# Patient Record
Sex: MALE | Race: OTHER | HISPANIC OR LATINO | Employment: FULL TIME | URBAN - METROPOLITAN AREA
[De-identification: names, ages, dates, MRNs, and addresses within clinical notes are randomized per-mention and may not be internally consistent; named-entity substitution may affect disease eponyms.]

---

## 2018-04-01 ENCOUNTER — HOSPITAL ENCOUNTER (EMERGENCY)
Facility: HOSPITAL | Age: 33
Discharge: HOME | End: 2018-04-01
Attending: EMERGENCY MEDICINE | Admitting: EMERGENCY MEDICINE
Payer: MEDICAID

## 2018-04-01 VITALS
RESPIRATION RATE: 16 BRPM | OXYGEN SATURATION: 98 % | TEMPERATURE: 97.9 F | DIASTOLIC BLOOD PRESSURE: 70 MMHG | SYSTOLIC BLOOD PRESSURE: 108 MMHG | WEIGHT: 194.22 LBS | HEART RATE: 108 BPM

## 2018-04-01 DIAGNOSIS — J45.901 ASTHMA EXACERBATION: Primary | ICD-10-CM

## 2018-04-01 PROCEDURE — 99283 EMERGENCY DEPT VISIT LOW MDM: CPT

## 2018-04-01 PROCEDURE — 94640 AIRWAY INHALATION TREATMENT: CPT

## 2018-04-01 RX ORDER — SODIUM CHLORIDE FOR INHALATION 0.9 %
3 VIAL, NEBULIZER (ML) INHALATION ONCE
Status: COMPLETED | OUTPATIENT
Start: 2018-04-01 | End: 2018-04-01

## 2018-04-01 RX ORDER — ALBUTEROL SULFATE 90 UG/1
2 AEROSOL, METERED RESPIRATORY (INHALATION) ONCE
Status: COMPLETED | OUTPATIENT
Start: 2018-04-01 | End: 2018-04-01

## 2018-04-01 RX ORDER — ALBUTEROL SULFATE 90 UG/1
2 AEROSOL, METERED RESPIRATORY (INHALATION) EVERY 6 HOURS PRN
COMMUNITY
End: 2018-09-13

## 2018-04-01 RX ORDER — ALBUTEROL SULFATE 2.5 MG/3ML
10 SOLUTION RESPIRATORY (INHALATION) ONCE
Status: COMPLETED | OUTPATIENT
Start: 2018-04-01 | End: 2018-04-01

## 2018-04-01 RX ADMIN — ISODIUM CHLORIDE 3 ML: 0.03 SOLUTION RESPIRATORY (INHALATION) at 01:41

## 2018-04-01 RX ADMIN — IPRATROPIUM BROMIDE 1 MG: 0.5 SOLUTION RESPIRATORY (INHALATION) at 01:41

## 2018-04-01 RX ADMIN — ALBUTEROL SULFATE 2 PUFF: 90 AEROSOL, METERED RESPIRATORY (INHALATION) at 03:11

## 2018-04-01 RX ADMIN — ALBUTEROL SULFATE 10 MG: 2.5 SOLUTION RESPIRATORY (INHALATION) at 01:41

## 2018-04-01 RX ADMIN — DEXAMETHASONE SODIUM PHOSPHATE 10 MG: 10 INJECTION, SOLUTION INTRAMUSCULAR; INTRAVENOUS at 01:41

## 2018-04-01 NOTE — ED PROVIDER NOTES
History  Chief Complaint   Patient presents with    Asthma     Patient presents complaining of asthma flare up, started approximately 1 hr ago  Audible wheezes heard in route to ED room  Denies using any meds prior to arrival  Denies intubation/admission hx related to asthma  35 y/o male, hx of asthma, presents to the ED for wheeze and chest tightness x 1 hour  Patient states that he was at the movie theater and went outside to "get some fresh air " states that the cold air caused him to have chest tightness, SOB, and wheeze  States that symptoms feel exactly like prior asthma exacerbations  He reports that he ran out of his albuterol inhaler a few days ago and has not taken anything for symptoms  He denies any recent f/c, cough, congestion, abd pain, n/v/d, or urinary symptoms  States that he was last hospitalized for asthma years ago when he was a baby  Denies any prior intubations  Denies any recent steroid or antibiotic use  Patient admits to smoking  Denies alcohol or drug use  History provided by:  Patient  Asthma   Location:  Chest  Quality:  Tightness, sob, wheeze  Severity:  Moderate  Onset quality:  Sudden  Duration:  1 hour  Timing:  Constant  Progression:  Worsening  Chronicity:  New  Context:  Went outside from the movie theater   Relieved by:  Nothing  Worsened by:  Nothing  Ineffective treatments:  None tried   Associated symptoms: cough, shortness of breath and wheezing    Associated symptoms: no abdominal pain, no chest pain, no congestion, no diarrhea, no ear pain, no fever, no headaches, no nausea, no rash, no sore throat and no vomiting    Risk factors:  Hx of asthma       Prior to Admission Medications   Prescriptions Last Dose Informant Patient Reported? Taking?    albuterol (PROVENTIL HFA,VENTOLIN HFA) 90 mcg/act inhaler Unknown at Unknown time Self Yes No   Sig: Inhale 2 puffs every 6 (six) hours as needed for wheezing      Facility-Administered Medications: None       Past Medical History:   Diagnosis Date    Asthma        Past Surgical History:   Procedure Laterality Date    ABDOMINAL SURGERY         History reviewed  No pertinent family history  I have reviewed and agree with the history as documented  Social History   Substance Use Topics    Smoking status: Current Every Day Smoker     Packs/day: 0 25    Smokeless tobacco: Never Used    Alcohol use No        Review of Systems   Constitutional: Negative for chills and fever  HENT: Negative for congestion, ear pain and sore throat  Eyes: Negative for pain and visual disturbance  Respiratory: Positive for cough, shortness of breath and wheezing  Cardiovascular: Negative for chest pain and leg swelling  Gastrointestinal: Negative for abdominal pain, diarrhea, nausea and vomiting  Genitourinary: Negative for dysuria, frequency, hematuria and urgency  Musculoskeletal: Negative for neck pain and neck stiffness  Skin: Negative for rash and wound  Neurological: Negative for weakness, numbness and headaches  Psychiatric/Behavioral: Negative for agitation and confusion  All other systems reviewed and are negative  Physical Exam  ED Triage Vitals   Temperature Pulse Respirations Blood Pressure SpO2   04/01/18 0128 04/01/18 0128 04/01/18 0129 04/01/18 0128 04/01/18 0128   97 9 °F (36 6 °C) 90 22 105/76 98 %      Temp Source Heart Rate Source Patient Position - Orthostatic VS BP Location FiO2 (%)   04/01/18 0128 04/01/18 0128 04/01/18 0128 04/01/18 0128 --   Oral Monitor Sitting Left arm       Pain Score       04/01/18 0128       No Pain           Orthostatic Vital Signs  Vitals:    04/01/18 0128 04/01/18 0249   BP: 105/76 108/70   Pulse: 90 (!) 108   Patient Position - Orthostatic VS: Sitting Sitting       Physical Exam   Constitutional: He is oriented to person, place, and time  He appears well-developed and well-nourished  HENT:   Head: Normocephalic and atraumatic     Mouth/Throat: Oropharynx is clear and moist    Eyes: EOM are normal  Pupils are equal, round, and reactive to light  Neck: Normal range of motion  Neck supple  Cardiovascular: Normal rate and regular rhythm  Pulmonary/Chest: Effort normal  No respiratory distress  He has decreased breath sounds in the right upper field, the right lower field, the left upper field and the left lower field  He has wheezes  Decreased breath sounds throughout with end expiratory wheezing throughout as well  Abdominal: Soft  Bowel sounds are normal  He exhibits no distension  There is no tenderness  Musculoskeletal: Normal range of motion  Neurological: He is alert and oriented to person, place, and time  No focal deficits   Skin: Skin is warm and dry  Nursing note and vitals reviewed  ED Medications  Medications   albuterol inhalation solution 10 mg (10 mg Nebulization Given 4/1/18 0141)     And   ipratropium (ATROVENT) 0 02 % inhalation solution 1 mg (1 mg Nebulization Given 4/1/18 0141)     And   sodium chloride 0 9 % inhalation solution 3 mL (3 mL Nebulization Given 4/1/18 0141)   dexamethasone 10 mg/mL oral liquid 10 mg 1 mL (10 mg Oral Given 4/1/18 0141)   albuterol (PROVENTIL HFA,VENTOLIN HFA) inhaler 2 puff (2 puffs Inhalation Given 4/1/18 0311)       Diagnostic Studies  Results Reviewed     None                 No orders to display         Procedures  Procedures      Phone Consults  ED Phone Contact    ED Course  ED Course as of Apr 01 0348   Sun Apr 01, 2018   0314 Patient reevaluated and feels improved - will d/c home with albuterol inahler                                 MDM  Number of Diagnoses or Management Options  Asthma exacerbation: new and requires workup  Diagnosis management comments: Patient with cough, wheeze, chest tightness likely secondary to asthma exacerbation  Will give heart neb and steroids  Will also give an albuterol inhaler to go home with  Patient reevaluated and feels improved    Discharge instructions given including medications, follow-up, and return precautions  Patient demonstrates verbal understanding and agrees with plan  Amount and/or Complexity of Data Reviewed  Clinical lab tests: ordered and reviewed  Tests in the radiology section of CPT®: ordered and reviewed  Tests in the medicine section of CPT®: ordered and reviewed  Discussion of test results with the performing providers: yes  Decide to obtain previous medical records or to obtain history from someone other than the patient: yes  Obtain history from someone other than the patient: yes  Review and summarize past medical records: yes  Discuss the patient with other providers: yes  Independent visualization of images, tracings, or specimens: yes    Patient Progress  Patient progress: improved    CritCare Time    Disposition  Final diagnoses:   Asthma exacerbation     Time reflects when diagnosis was documented in both MDM as applicable and the Disposition within this note     Time User Action Codes Description Comment    4/1/2018  3:09 AM Shannon Shepherd Add [J45 [id number]] Asthma exacerbation       ED Disposition     ED Disposition Condition Comment    Discharge  Climmie League discharge to home/self care  Condition at discharge: Good        Follow-up Information     Follow up With Specialties Details Why Contact Info Additional Arcelia Christopher [facility] Call in 1 day for follow up within 2-3 days  [address] [id number] [id number] Emergency Department Emergency Medicine Go to immediately for any new or worsening symptoms    Qi Saels [address] [place] [facility], [address] , [place], [place], [address]        Discharge Medication List as of 4/1/2018  3:10 AM      CONTINUE these medications which have NOT CHANGED    Details   albuterol (PROVENTIL HFA,VENTOLIN HFA) 90 mcg/act inhaler Inhale 2 puffs every 6 (six) hours as needed for wheezing, Historical Med           No discharge procedures on file  ED Provider  Attending physically available and evaluated Georgiana Mcgill I managed the patient along with the ED Attending      Electronically Signed by         Chantelle Smith DO  04/01/18 0780

## 2018-04-01 NOTE — ED PROCEDURE NOTE
PROCEDURE  CriticalCare Time  Performed by: Dseiree Ash  Authorized by: Desiree Ash     Critical care provider statement:     Critical care time (minutes):  45    Critical care time was exclusive of:  Separately billable procedures and treating other patients and teaching time    Critical care was necessary to treat or prevent imminent or life-threatening deterioration of the following conditions:  Respiratory failure    Critical care was time spent personally by me on the following activities:  Blood draw for specimens, obtaining history from patient or surrogate, development of treatment plan with patient or surrogate, discussions with consultants, evaluation of patient's response to treatment, examination of patient, interpretation of cardiac output measurements, ordering and performing treatments and interventions, ordering and review of laboratory studies, ordering and review of radiographic studies, review of old charts and re-evaluation of patient's condition    I assumed direction of critical care for this patient from another provider in my specialty: jaci Santa , DO  04/01/18 4607

## 2018-04-01 NOTE — DISCHARGE INSTRUCTIONS
Asthma   WHAT YOU NEED TO KNOW:   Asthma is a lung disease that makes breathing difficult  Chronic inflammation and reactions to triggers narrow the airways in the lungs  Asthma can become life-threatening if it is not managed  DISCHARGE INSTRUCTIONS:   Return to the emergency department if:   · You have severe shortness of breath  · Your lips or nails turn blue or gray  · The skin around your neck and ribs pulls in with each breath  · You have shortness of breath, even after you take your short-term medicine as directed  · Your peak flow numbers are in the red zone of your AAP  Contact your healthcare provider if:   · You run out of medicine before your next refill is due  · Your symptoms get worse  · You need to take more medicine than usual to control your symptoms  · You have questions or concerns about your condition or care  Medicines:   · Medicines  decrease inflammation, open airways, and make it easier to breathe  Medicines may be inhaled, taken as a pill, or injected  Short-term medicines relieve your symptoms quickly  Long-term medicines are used to prevent future attacks  You may also need medicine to help control your allergies  Ask your healthcare provider for more information about the medicine you are given and how to take it safely  · Take your medicine as directed  Contact your healthcare provider if you think your medicine is not helping or if you have side effects  Tell him of her if you are allergic to any medicine  Keep a list of the medicines, vitamins, and herbs you take  Include the amounts, and when and why you take them  Bring the list or the pill bottles to follow-up visits  Carry your medicine list with you in case of an emergency  Follow up with your healthcare provider as directed: You will need to return to make sure your medicine is working and your symptoms are controlled   You may be referred to an asthma specialist  Lionel Ndiaye may be asked to keep a record of your peak flow values and bring it with you to your appointments  Write down your questions so you remember to ask them during your visits  Manage your symptoms and prevent future attacks:   · Follow your Asthma Action Plan (AAP)  This is a written plan that you and your healthcare provider create  It explains which medicine you need and when to change doses if necessary  It also explains how you can monitor symptoms and use a peak flow meter  The meter measures how well your lungs are working  · Manage other health conditions , such as allergies, acid reflux, and sleep apnea  · Identify and avoid triggers  These may include pets, dust mites, mold, and cockroaches  · Do not smoke or be around others who smoke  Nicotine and other chemicals in cigarettes and cigars can cause lung damage  Ask your healthcare provider for information if you currently smoke and need help to quit  E-cigarettes or smokeless tobacco still contain nicotine  Talk to your healthcare provider before you use these products  · Ask about the flu vaccine  The flu can make your asthma worse  You may need a yearly flu shot  © 2017 [facility] Information is for End User's use only and may not be sold, redistributed or otherwise used for commercial purposes  All illustrations and images included in CareNotes® are the copyrighted property of A D A M , Inc  or Curtis Joseph  The above information is an  only  It is not intended as medical advice for individual conditions or treatments  Talk to your doctor, nurse or pharmacist before following any medical regimen to see if it is safe and effective for you

## 2018-04-01 NOTE — ED ATTENDING ATTESTATION
Ivania Ballesteros DO, saw and evaluated the patient  I have discussed the patient with the resident/non-physician practitioner and agree with the resident's/non-physician practitioner's findings, Plan of Care, and MDM as documented in the resident's/non-physician practitioner's note, except where noted  All available labs and Radiology studies were reviewed  At this point I agree with the current assessment done in the Emergency Department  I have conducted an independent evaluation of this patient a history and physical is as follows:    62-year-old male presents for an asthma exacerbation tonight  He states that he was coming out of the movies tonight when he had an abrupt onset of coughing and wheezing  Patient recently ran out of his inhaler  He is not on any long-term maintenance  Patient has been otherwise well without any upper respiratory symptoms  Patient is in no acute distress on exam   Vital signs are noted and are normal  Lung sounds are decreased with wheezing  Heart sounds are normal without murmurs  Plan is to treat his exacerbation with a nebulizer and steroids and reassess  Patient much improved after his treatment  Will discharge home with an albuterol inhaler and outpatient follow-up    Critical Care Time  CritCare Time    Procedures

## 2018-07-24 ENCOUNTER — HOSPITAL ENCOUNTER (EMERGENCY)
Facility: HOSPITAL | Age: 33
Discharge: HOME | End: 2018-07-24
Attending: EMERGENCY MEDICINE | Admitting: EMERGENCY MEDICINE
Payer: MEDICAID

## 2018-07-24 VITALS
SYSTOLIC BLOOD PRESSURE: 116 MMHG | HEART RATE: 92 BPM | WEIGHT: 187.39 LBS | DIASTOLIC BLOOD PRESSURE: 77 MMHG | OXYGEN SATURATION: 98 % | TEMPERATURE: 98.4 F | RESPIRATION RATE: 15 BRPM

## 2018-07-24 DIAGNOSIS — H44.703: Primary | ICD-10-CM

## 2018-07-24 DIAGNOSIS — R06.2 WHEEZING: ICD-10-CM

## 2018-07-24 PROCEDURE — 99283 EMERGENCY DEPT VISIT LOW MDM: CPT

## 2018-07-24 RX ORDER — ALBUTEROL SULFATE 90 UG/1
1-2 AEROSOL, METERED RESPIRATORY (INHALATION) EVERY 6 HOURS PRN
Qty: 1 INHALER | Refills: 0 | Status: SHIPPED | OUTPATIENT
Start: 2018-07-24 | End: 2019-01-03

## 2018-07-24 RX ORDER — PROPARACAINE HYDROCHLORIDE 5 MG/ML
1 SOLUTION/ DROPS OPHTHALMIC ONCE
Status: COMPLETED | OUTPATIENT
Start: 2018-07-24 | End: 2018-07-24

## 2018-07-24 RX ADMIN — PROPARACAINE HYDROCHLORIDE 1 DROP: 5 SOLUTION/ DROPS OPHTHALMIC at 16:41

## 2018-07-24 RX ADMIN — FLUORESCEIN SODIUM AND PROPARACAINE HYDROCHLORIDE 1 DROP: 2.5; 5 SOLUTION/ DROPS OPHTHALMIC at 15:19

## 2018-07-24 NOTE — ED PROVIDER NOTES
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 and drain pipe burst   States got metal in his eyes  c/o burning pain in both  Did flush eyes     29-year-old male presents to the ER with bilateral eye pain and tearing that started last night at to a m  when patient was working on plumbing and states that a height purse and small amounts of metal flew up toward his eyes  Patient states that at that time he was wearing his regular glasses and denies any injury to the glasses but that the water went up and did hit his eyes  Patient states that he feels a foreign body sensation in both eyes and has had a large amount of tearing from both eyes and admits to mild photophobia  Patient states that he noticed foreign body in his right eye when he looked in the mirror  Patient states that he is feeling increased irritation since waking up this morning and came in to be examined due to concerns over possible foreign bodies in both eyes  Patient also states that he has a history of asthma and has run out of his inhaler  He does not currently have a family doctor and wanted to have a refill of his Ventolin inhaler  Prior to Admission Medications   Prescriptions Last Dose Informant Patient Reported? Taking? albuterol (PROVENTIL HFA,VENTOLIN HFA) 90 mcg/act inhaler  Self Yes No   Sig: Inhale 2 puffs every 6 (six) hours as needed for wheezing      Facility-Administered Medications: None       Past Medical History:   Diagnosis Date    Asthma        Past Surgical History:   Procedure Laterality Date    ABDOMINAL SURGERY         History reviewed  No pertinent family history  I have reviewed and agree with the history as documented      Social History   Substance Use Topics    Smoking status: Current Every Day Smoker     Packs/day: 0 25    Smokeless tobacco: Never Used    Alcohol use No        Review of Systems   Constitutional: Negative for chills and fever  HENT: Negative for congestion, ear pain, rhinorrhea, sinus pain, sinus pressure, trouble swallowing and voice change  Eyes: Positive for photophobia, pain, discharge ( clear tearing from eyes), redness and visual disturbance (Due to tearing, blurry vision)  Respiratory: Positive for wheezing  Negative for chest tightness and shortness of breath  Cardiovascular: Negative for chest pain and palpitations  Gastrointestinal: Negative for abdominal pain, nausea and vomiting  Musculoskeletal: Negative for arthralgias, myalgias, neck pain and neck stiffness  Skin: Negative for color change and rash  Neurological: Negative for dizziness, weakness, light-headedness, numbness and headaches  All other systems reviewed and are negative  Physical Exam  Physical Exam   Constitutional: He is oriented to person, place, and time  Vital signs are normal  He appears well-developed and well-nourished  HENT:   Head: Normocephalic and atraumatic  Eyes: Conjunctivae and EOM are normal  Pupils are equal, round, and reactive to light  Foreign body present in the right eye  Foreign body present in the left eye  Neck: Normal range of motion  Neck supple  Cardiovascular: Normal rate, regular rhythm and normal heart sounds  Pulmonary/Chest: Effort normal  He has wheezes (Mild expiratory wheezes noted throughout lung fields)  Abdominal: Soft  Bowel sounds are normal    Musculoskeletal: Normal range of motion  Neurological: He is alert and oriented to person, place, and time  Skin: Skin is warm and dry  Psychiatric: He has a normal mood and affect  His speech is normal and behavior is normal  Judgment and thought content normal  Cognition and memory are normal    Nursing note and vitals reviewed        Vital Signs  ED Triage Vitals [07/24/18 1433]   Temperature Pulse Respirations Blood Pressure SpO2   98 4 °F (36 9 °C) 92 15 116/77 98 %      Temp Source Heart Rate Source Patient Position - Orthostatic VS BP Location FiO2 (%)   Oral -- -- -- --      Pain Score       4           Vitals:    07/24/18 1433   BP: 116/77   Pulse: 92       Visual Acuity      ED Medications  Medications   Fluorescein-Proparacaine (FLUCAINE) 0 25-0 5 % ophthalmic solution 1 drop (1 drop Both Eyes Given 7/24/18 1519)   proparacaine (ALCAINE) 0 5 % ophthalmic solution 1 drop (1 drop Right Eye Given 7/24/18 1641)       Diagnostic Studies  Results Reviewed     None                 No orders to display              Procedures  Foreign Body - Ocular  Date/Time: 7/24/2018 3:30 PM  Performed by: Keiko Hutchison by: Cristhian Hutchison     Patient location:  ED  Other Assisting Provider: No    Consent:     Consent obtained:  Verbal    Consent given by:  Patient    Risks discussed:  Bleeding, globe perforation, pain, visual impairment, incomplete removal, corneal damage, damage to surrounding structures, infection and worsening of condition    Alternatives discussed:  No treatment  Universal protocol:     Procedure explained and questions answered to patient or proxy's satisfaction: yes      Patient identity confirmed:  Verbally with patient  Location:     Location: Left and right corneal foreign bodies  Depth:  Embedded  Pre-procedure details:     Imaging:  None    Correction: uncorrected      Fluorescein exam: yes      Fluorescein uptake: no    Anesthesia (see MAR for exact dosages):     Local anesthetic:  Proparacaine drops  Procedure details:     Localization method:  Direct visualization    Removal mechanism:  Moist cotton swab    Foreign bodies recovered:  None (Small black particles were removed from areas but full foreign bodies were not removed due to patient discomfort)    Intact foreign body removal: no      Residual rust ring observed: no    Post-procedure details:     Confirmation:  Residual foreign bodies remain    Patient tolerance of procedure:   Tolerated with difficulty  Comments:      Patient continued to have tearing and discomfort after proparacaine drops were placed procedure terminated due to patient discomfort  [address] [place] for sight in [address] and discussed patient  They suggested patient come right over to be seen before they closed today so he could have evaluation and attempt at removal of foreign bodies by eye doctor  Phone Contacts  ED Phone Contact    ED Course  ED Course as of Jul 27 0236 Tue Jul 24, 2018   [address] [place] for [facility] in [facility]                                MDM  Number of Diagnoses or Management Options  Retained foreign body in eye, bilateral: new and does not require workup  Wheezing: minor  Patient Progress  Patient progress: stable    CritCare Time    Disposition  Final diagnoses:   Retained foreign body in eye, bilateral   Wheezing     Time reflects when diagnosis was documented in both MDM as applicable and the Disposition within this note     Time User Action Codes Description Comment    7/24/2018  4:19 PM Aye Lyons [E33 [id number]] Retained foreign body in eye, bilateral     7/24/2018  4:33 PM Cierra Ling [R06 2] Wheezing       ED Disposition     ED Disposition Condition Comment    Discharge  Dominga Mention discharge to home/self care  Condition at discharge: Stable        Follow-up Information     Follow up With Specialties Details Why [facility]   Go for further evaluation of eyes [address]  [phone]          Discharge Medication List as of 7/24/2018  4:34 PM      START taking these medications    Details   !! albuterol (PROVENTIL HFA,VENTOLIN HFA) 90 mcg/act inhaler Inhale 1-2 puffs every 6 (six) hours as needed for wheezing, Starting Tue 7/24/2018, Print       !! - Potential duplicate medications found  Please discuss with provider        CONTINUE these medications which have NOT CHANGED    Details   !! albuterol (PROVENTIL HFA,VENTOLIN HFA) 90 mcg/act inhaler Inhale 2 puffs every 6 (six) hours as needed for wheezing, Historical Med       !! - Potential duplicate medications found  Please discuss with provider  No discharge procedures on file      ED Provider  Electronically Signed by           Marry Barton PA-C  07/27/18 0230

## 2018-07-24 NOTE — DISCHARGE INSTRUCTIONS
Wheezing   WHAT YOU NEED TO KNOW:   Wheezing happens when air flows through a narrowed airway  Wheezing can happen when you breathe in, breathe out, or both  Wheezes may sound like a whistle, squeal, groan, or creak  Wheezes may also sound musical or high-pitched  Wheezing cannot be stopped by coughing  Asthma, allergies, or infection are the most common causes of wheezing  A foreign body, asthma, extra mucus, or smoking can also cause wheezing  DISCHARGE INSTRUCTIONS:   Call 911 if:   · You have sudden trouble breathing  · Your throat feels like it is swelling or feels tight  · You are dizzy, lightheaded, confused, or feel faint  · You have chest pain or tightness  Return to the emergency department if:   · You have shortness of breath  · You are coughing up blood  · You have chest pain  Contact your healthcare provider if:   · You have a fever  · Your wheezing does not get better or it gets worse  · You have questions or concerns about your condition or care  Medicines:   · Medicines  decrease inflammation, open airways, and make it easier to breathe  · Take your medicine as directed  Contact your healthcare provider if you think your medicine is not helping or if you have side effects  Tell him of her if you are allergic to any medicine  Keep a list of the medicines, vitamins, and herbs you take  Include the amounts, and when and why you take them  Bring the list or the pill bottles to follow-up visits  Carry your medicine list with you in case of an emergency  Follow up with your healthcare provider as directed: You may be referred to a specialist  Write down your questions so you remember to ask them during your visits  Manage your symptoms:   · Avoid allergy triggers , such as animals, grass, pollen, or dust     · Return to your usual activity as directed  You may need to limit certain activities until you follow up with your healthcare provider or your symptoms improve  Your child may need to avoid sports until his symptoms improve  · Take deep breaths and cough several times a day  This will decrease your risk for a lung infection and help decrease wheezing  Take a deep breath and hold it for as long as you can  Let the air out and then cough strongly  Deep breaths help open your airway  You may be given an incentive spirometer to help you take deep breaths  Put the plastic piece in your mouth and take a slow, deep breath, then let the air out and cough  Repeat these steps 10 times every hour  · Drink liquids as directed  You may need to drink more liquids than usual to thin your mucus and prevent dehydration  Ask how much liquid to drink each day and which liquids are best for you  © 2017 [facility] [employer] Information is for End User's use only and may not be sold, redistributed or otherwise used for commercial purposes  All illustrations and images included in CareNotes® are the copyrighted property of A D A M [employer]  or Curtis Joseph  The above information is an  only  It is not intended as medical advice for individual conditions or treatments  Talk to your doctor, nurse or pharmacist before following any medical regimen to see if it is safe and effective for you

## 2018-09-13 ENCOUNTER — APPOINTMENT (EMERGENCY)
Dept: CT IMAGING | Facility: HOSPITAL | Age: 33
End: 2018-09-13
Payer: MEDICAID

## 2018-09-13 ENCOUNTER — HOSPITAL ENCOUNTER (EMERGENCY)
Facility: HOSPITAL | Age: 33
Discharge: HOME | End: 2018-09-13
Attending: EMERGENCY MEDICINE
Payer: MEDICAID

## 2018-09-13 VITALS
SYSTOLIC BLOOD PRESSURE: 108 MMHG | RESPIRATION RATE: 18 BRPM | DIASTOLIC BLOOD PRESSURE: 63 MMHG | OXYGEN SATURATION: 96 % | HEART RATE: 82 BPM | WEIGHT: 179.9 LBS | HEIGHT: 70 IN | TEMPERATURE: 98 F | BODY MASS INDEX: 25.75 KG/M2

## 2018-09-13 DIAGNOSIS — R11.2 NAUSEA & VOMITING: ICD-10-CM

## 2018-09-13 DIAGNOSIS — R51.9 HEADACHE: ICD-10-CM

## 2018-09-13 DIAGNOSIS — R10.9 ABDOMINAL PAIN: Primary | ICD-10-CM

## 2018-09-13 LAB
ALBUMIN SERPL BCP-MCNC: 4.8 G/DL (ref 3–5.2)
ALP SERPL-CCNC: 68 U/L (ref 43–122)
ALT SERPL W P-5'-P-CCNC: 40 U/L (ref 9–52)
ANION GAP SERPL CALCULATED.3IONS-SCNC: 9 MMOL/L (ref 5–14)
AST SERPL W P-5'-P-CCNC: 40 U/L (ref 17–59)
ATRIAL RATE: 83 BPM
BASOPHILS # BLD AUTO: 0 THOUSANDS/ΜL (ref 0–0.1)
BASOPHILS NFR BLD AUTO: 1 % (ref 0–1)
BILIRUB SERPL-MCNC: 1.1 MG/DL
BUN SERPL-MCNC: 13 MG/DL (ref 5–25)
CALCIUM SERPL-MCNC: 9.2 MG/DL (ref 8.4–10.2)
CHLORIDE SERPL-SCNC: 101 MMOL/L (ref 97–108)
CO2 SERPL-SCNC: 30 MMOL/L (ref 22–30)
CREAT SERPL-MCNC: 0.84 MG/DL (ref 0.7–1.5)
EOSINOPHIL # BLD AUTO: 0.2 THOUSAND/ΜL (ref 0–0.4)
EOSINOPHIL NFR BLD AUTO: 2 % (ref 0–6)
ERYTHROCYTE [DISTWIDTH] IN BLOOD BY AUTOMATED COUNT: 13.6 %
GFR SERPL CREATININE-BSD FRML MDRD: 114 ML/MIN/1.73SQ M
GLUCOSE SERPL-MCNC: 87 MG/DL (ref 70–99)
HCT VFR BLD AUTO: 46.1 % (ref 41–53)
HGB BLD-MCNC: 15.2 G/DL (ref 13.5–17.5)
LACTATE SERPL-SCNC: 1 MMOL/L (ref 0.7–2)
LIPASE SERPL-CCNC: 167 U/L (ref 23–300)
LYMPHOCYTES # BLD AUTO: 1.1 THOUSANDS/ΜL (ref 0.5–4)
LYMPHOCYTES NFR BLD AUTO: 13 % (ref 20–50)
MCH RBC QN AUTO: 27.5 PG (ref 26–34)
MCHC RBC AUTO-ENTMCNC: 33 G/DL (ref 31–36)
MCV RBC AUTO: 83 FL (ref 80–100)
MONOCYTES # BLD AUTO: 0.6 THOUSAND/ΜL (ref 0.2–0.9)
MONOCYTES NFR BLD AUTO: 7 % (ref 1–10)
NEUTROPHILS # BLD AUTO: 6.5 THOUSANDS/ΜL (ref 1.8–7.8)
NEUTS SEG NFR BLD AUTO: 78 % (ref 45–65)
PLATELET # BLD AUTO: 210 THOUSANDS/UL (ref 150–450)
PMV BLD AUTO: 9.7 FL (ref 8.9–12.7)
POTASSIUM SERPL-SCNC: 5.2 MMOL/L (ref 3.6–5)
PR INTERVAL: 160 MS
PROT SERPL-MCNC: 8.4 G/DL (ref 5.9–8.4)
QRS AXIS: 145 DEGREES
QRSD INTERVAL: 90 MS
QT INTERVAL: 358 MS
QTC INTERVAL: 420 MS
RBC # BLD AUTO: 5.54 MILLION/UL (ref 4.5–5.9)
SODIUM SERPL-SCNC: 140 MMOL/L (ref 137–147)
T WAVE AXIS: 145 DEGREES
VENTRICULAR RATE: 83 BPM
WBC # BLD AUTO: 8.4 THOUSAND/UL (ref 4.5–11)

## 2018-09-13 PROCEDURE — 83690 ASSAY OF LIPASE: CPT | Performed by: EMERGENCY MEDICINE

## 2018-09-13 PROCEDURE — 85025 COMPLETE CBC W/AUTO DIFF WBC: CPT | Performed by: EMERGENCY MEDICINE

## 2018-09-13 PROCEDURE — 36415 COLL VENOUS BLD VENIPUNCTURE: CPT | Performed by: EMERGENCY MEDICINE

## 2018-09-13 PROCEDURE — 80053 COMPREHEN METABOLIC PANEL: CPT | Performed by: EMERGENCY MEDICINE

## 2018-09-13 PROCEDURE — 93005 ELECTROCARDIOGRAM TRACING: CPT

## 2018-09-13 PROCEDURE — 96361 HYDRATE IV INFUSION ADD-ON: CPT

## 2018-09-13 PROCEDURE — 96374 THER/PROPH/DIAG INJ IV PUSH: CPT

## 2018-09-13 PROCEDURE — 93010 ELECTROCARDIOGRAM REPORT: CPT | Performed by: INTERNAL MEDICINE

## 2018-09-13 PROCEDURE — 99284 EMERGENCY DEPT VISIT MOD MDM: CPT

## 2018-09-13 PROCEDURE — 96375 TX/PRO/DX INJ NEW DRUG ADDON: CPT

## 2018-09-13 PROCEDURE — 83605 ASSAY OF LACTIC ACID: CPT | Performed by: EMERGENCY MEDICINE

## 2018-09-13 PROCEDURE — 74177 CT ABD & PELVIS W/CONTRAST: CPT

## 2018-09-13 RX ORDER — DICYCLOMINE HCL 20 MG
20 TABLET ORAL ONCE
Status: DISCONTINUED | OUTPATIENT
Start: 2018-09-13 | End: 2018-09-13 | Stop reason: HOSPADM

## 2018-09-13 RX ORDER — ALBUTEROL SULFATE 90 UG/1
2 AEROSOL, METERED RESPIRATORY (INHALATION) EVERY 4 HOURS PRN
Qty: 1 INHALER | Refills: 0 | Status: SHIPPED | OUTPATIENT
Start: 2018-09-13

## 2018-09-13 RX ORDER — ONDANSETRON 4 MG/1
4 TABLET, FILM COATED ORAL EVERY 6 HOURS
Qty: 12 TABLET | Refills: 0 | Status: SHIPPED | OUTPATIENT
Start: 2018-09-13 | End: 2019-01-03

## 2018-09-13 RX ORDER — DICYCLOMINE HYDROCHLORIDE 10 MG/1
CAPSULE ORAL
Status: COMPLETED
Start: 2018-09-13 | End: 2018-09-13

## 2018-09-13 RX ORDER — FAMOTIDINE 20 MG/1
20 TABLET, FILM COATED ORAL ONCE
Status: COMPLETED | OUTPATIENT
Start: 2018-09-13 | End: 2018-09-13

## 2018-09-13 RX ORDER — MORPHINE SULFATE 10 MG/ML
INJECTION, SOLUTION INTRAMUSCULAR; INTRAVENOUS
Status: DISCONTINUED
Start: 2018-09-13 | End: 2018-09-13 | Stop reason: HOSPADM

## 2018-09-13 RX ORDER — DICYCLOMINE HCL 20 MG
20 TABLET ORAL 2 TIMES DAILY
Qty: 20 TABLET | Refills: 0 | Status: SHIPPED | OUTPATIENT
Start: 2018-09-13 | End: 2019-01-03

## 2018-09-13 RX ORDER — KETOROLAC TROMETHAMINE 30 MG/ML
INJECTION, SOLUTION INTRAMUSCULAR; INTRAVENOUS
Status: DISCONTINUED
Start: 2018-09-13 | End: 2018-09-13 | Stop reason: HOSPADM

## 2018-09-13 RX ORDER — ACETAMINOPHEN 325 MG/1
650 TABLET ORAL ONCE
Status: COMPLETED | OUTPATIENT
Start: 2018-09-13 | End: 2018-09-13

## 2018-09-13 RX ORDER — ACETAMINOPHEN 325 MG/1
TABLET ORAL
Status: DISCONTINUED
Start: 2018-09-13 | End: 2018-09-13 | Stop reason: HOSPADM

## 2018-09-13 RX ORDER — MAGNESIUM HYDROXIDE/ALUMINUM HYDROXICE/SIMETHICONE 120; 1200; 1200 MG/30ML; MG/30ML; MG/30ML
SUSPENSION ORAL
Status: DISCONTINUED
Start: 2018-09-13 | End: 2018-09-13 | Stop reason: HOSPADM

## 2018-09-13 RX ORDER — FAMOTIDINE 20 MG/1
TABLET, FILM COATED ORAL
Status: DISCONTINUED
Start: 2018-09-13 | End: 2018-09-13 | Stop reason: HOSPADM

## 2018-09-13 RX ORDER — ACETAMINOPHEN 500 MG
500 TABLET ORAL EVERY 6 HOURS PRN
Qty: 30 TABLET | Refills: 0 | Status: SHIPPED | OUTPATIENT
Start: 2018-09-13 | End: 2019-01-03

## 2018-09-13 RX ORDER — KETOROLAC TROMETHAMINE 30 MG/ML
15 INJECTION, SOLUTION INTRAMUSCULAR; INTRAVENOUS ONCE
Status: COMPLETED | OUTPATIENT
Start: 2018-09-13 | End: 2018-09-13

## 2018-09-13 RX ORDER — MAGNESIUM HYDROXIDE/ALUMINUM HYDROXICE/SIMETHICONE 120; 1200; 1200 MG/30ML; MG/30ML; MG/30ML
30 SUSPENSION ORAL ONCE
Status: COMPLETED | OUTPATIENT
Start: 2018-09-13 | End: 2018-09-13

## 2018-09-13 RX ORDER — MORPHINE SULFATE 10 MG/ML
6 INJECTION, SOLUTION INTRAMUSCULAR; INTRAVENOUS ONCE
Status: COMPLETED | OUTPATIENT
Start: 2018-09-13 | End: 2018-09-13

## 2018-09-13 RX ADMIN — KETOROLAC TROMETHAMINE 15 MG: 30 INJECTION, SOLUTION INTRAMUSCULAR; INTRAVENOUS at 01:32

## 2018-09-13 RX ADMIN — SODIUM CHLORIDE 1000 ML: 9 INJECTION, SOLUTION INTRAVENOUS at 01:31

## 2018-09-13 RX ADMIN — MORPHINE SULFATE 6 MG: 10 INJECTION INTRAVENOUS at 01:32

## 2018-09-13 RX ADMIN — ALUMINUM HYDROXIDE, MAGNESIUM HYDROXIDE, AND SIMETHICONE 30 ML: 200; 200; 20 SUSPENSION ORAL at 03:41

## 2018-09-13 RX ADMIN — FAMOTIDINE 20 MG: 20 TABLET ORAL at 03:41

## 2018-09-13 RX ADMIN — ACETAMINOPHEN 650 MG: 325 TABLET ORAL at 01:31

## 2018-09-13 RX ADMIN — DICYCLOMINE HYDROCHLORIDE 20 MG: 10 CAPSULE ORAL at 03:41

## 2018-09-13 RX ADMIN — IOHEXOL 100 ML: 350 INJECTION, SOLUTION INTRAVENOUS at 02:19

## 2018-09-13 NOTE — ED TRIAGE NOTES
Abdominal pain started yesterday with vomiting  No diarrhea  Is passing gas  Last bm yesterday  Also with headache

## 2018-09-13 NOTE — ED NOTES
Patient reports that his headache is gone and that his stomach hurts a little bit  Patient resting quietly with his eyes closed       Sraa Jimenez RN  09/13/18 0175

## 2018-09-13 NOTE — ED NOTES
Dr Korin Cosme in room to speak with patient concerning results of testing that was done as well as follow up plan of care and discharge instructions       Raiza Barton RN  09/13/18 7566

## 2018-09-13 NOTE — ED NOTES
Patient sleeping upon entering room - states that his pain is better -" feels it a little bit but it's not that bad "     Shobha Tim RN  09/13/18 5688

## 2018-09-13 NOTE — DISCHARGE INSTRUCTIONS
Please follow-up with the primary care provider for further care, if symptoms worsen please return to the emergency department  Please call your general surgeon for further evaluation if your symptoms continue despite medications  Abdominal Pain, Ambulatory Care   GENERAL INFORMATION:   Abdominal pain  can be dull, achy, or sharp  You may have pain in one area of your abdomen, or in your entire abdomen  Your pain may be caused by constipation, food sensitivity or poisoning, infection, or a blockage  Abdominal pain can also be caused by a hernia, appendicitis, or an ulcer  The cause of your abdominal pain may be unknown  Seek immediate care for the following symptoms:   · New chest pain or shortness of breath    · Pulsing pain in your upper abdomen or lower back that suddenly becomes constant    · Pain in the right lower abdominal area that worsens with movement    · Fever over 100 4°F (38°C) or shaking chills    · Vomiting and you cannot keep food or fluids down    · Pain does not improve or gets worse over the next 8 to 12 hours    · Blood in your vomit or bowel movements, or they look black and tarry    · Skin or the whites of your eyes turn yellow    · Large amount of vaginal bleeding that is not your monthly period  Treatment for abdominal pain  may include medicine to calm your stomach, prevent vomiting, or decrease pain  Follow up with your healthcare provider as directed:  Write down your questions so you remember to ask them during your visits  CARE AGREEMENT:   You have the right to help plan your care  Learn about your health condition and how it may be treated  Discuss treatment options with your caregivers to decide what care you want to receive  You always have the right to refuse treatment  The above information is an  only  It is not intended as medical advice for individual conditions or treatments   Talk to your doctor, nurse or pharmacist before following any medical regimen to see if it is safe and effective for you  © 2014 [address] is for End User's use only and may not be sold, redistributed or otherwise used for commercial purposes  All illustrations and images included in CareNotes® are the copyrighted property of A D A M , Inc  or Curtis Joseph

## 2018-09-13 NOTE — ED PROVIDER NOTES
History  Chief Complaint   Patient presents with    Abdominal Pain    Headache     27-year-old male past medical history of multiple abdominal surgeries and multiple episodes of bowel obstruction presents for evaluation of worsening right lower quadrant abdominal pain x2 days  Patient states that the pain feels similar to his previous bowel obstructions  Most recently had obstruction surgery, which included appendectomy last [place] and previous to that had 4 surgeries by Dr Donaldo Mackenize at [facility] for obstruction and hernia issues  Patient states that he vomited up to 5 times today but did not notice any blood or bile in his vomit  He denies any diarrhea and states he had a normal bowel movement yesterday but today no further BMs though he feels like he has to go  He also states that he developed of frontal pressure-like headache gradually throughout the day  There is no associated vision or hearing difficulties no associated trouble walking numbness or tingling in the extremities  No similar headaches in the past    There are no relieving factor beating factors  He did not try any medications prior to arrival             Prior to Admission Medications   Prescriptions Last Dose Informant Patient Reported? Taking? albuterol (PROVENTIL HFA,VENTOLIN HFA) 90 mcg/act inhaler   No No   Sig: Inhale 1-2 puffs every 6 (six) hours as needed for wheezing      Facility-Administered Medications: None       Past Medical History:   Diagnosis Date    Asthma     Small bowel obstruction (HCC)        Past Surgical History:   Procedure Laterality Date    ABDOMINAL SURGERY      umbilical hernia and right hernia repair; small bowel obstruction    UMBILICAL HERNIA REPAIR Right 2016       History reviewed  No pertinent family history  I have reviewed and agree with the history as documented      Social History   Substance Use Topics    Smoking status: Current Every Day Smoker     Packs/day: 0 25    Smokeless tobacco: Never Used    Alcohol use No        Review of Systems   Constitutional: Positive for chills  Negative for appetite change and fever  HENT: Negative for rhinorrhea and sore throat  Eyes: Negative for photophobia and visual disturbance  Respiratory: Negative for cough and shortness of breath  Cardiovascular: Negative for chest pain and palpitations  Gastrointestinal: Positive for abdominal pain, nausea and vomiting  Negative for diarrhea  Genitourinary: Negative for dysuria, frequency and urgency  Skin: Negative for rash  Neurological: Positive for headaches  Negative for dizziness and weakness  All other systems reviewed and are negative  Physical Exam  Physical Exam   Constitutional: He is oriented to person, place, and time  He appears well-developed and well-nourished  HENT:   Head: Normocephalic and atraumatic  Right Ear: External ear normal    Left Ear: External ear normal    Mouth/Throat: Oropharynx is clear and moist    Eyes: Conjunctivae and EOM are normal  Pupils are equal, round, and reactive to light  Neck: Normal range of motion  Neck supple  No JVD present  No tracheal deviation present  Cardiovascular: Normal rate, regular rhythm and normal heart sounds  Exam reveals no gallop and no friction rub  No murmur heard  Pulmonary/Chest: Effort normal  No stridor  No respiratory distress  He has no wheezes  He has no rales  Abdominal: Soft  He exhibits no distension and no mass  There is tenderness  There is no rebound and no guarding  There is a large midline surgical scar noted on the patient's abdomen which appears to be old  He is tender to palpation in the right lower quadrant with some voluntary guarding   Musculoskeletal: Normal range of motion  He exhibits no edema  Neurological: He is alert and oriented to person, place, and time  No cranial nerve deficit     Nonfocal neurologic exam including normal gait, extraocular movements intact, normal fluent speech   Skin: Skin is warm and dry  No rash noted  No erythema  No pallor  Psychiatric: He has a normal mood and affect  Nursing note and vitals reviewed        Vital Signs  ED Triage Vitals   Temperature Pulse Respirations Blood Pressure SpO2   09/13/18 0023 09/13/18 0023 09/13/18 0023 09/13/18 0023 09/13/18 0112   98 °F (36 7 °C) 96 20 105/67 99 %      Temp Source Heart Rate Source Patient Position - Orthostatic VS BP Location FiO2 (%)   09/13/18 0023 09/13/18 0023 09/13/18 0023 09/13/18 0023 --   Temporal Monitor Sitting Left arm       Pain Score       09/13/18 0023       9           Vitals:    09/13/18 0023 09/13/18 0112 09/13/18 0155 09/13/18 0233   BP: 105/67 121/68 107/66 116/71   Pulse: 96 96 89 86   Patient Position - Orthostatic VS: Sitting Lying Lying Lying       Visual Acuity  Visual Acuity      Most Recent Value   L Pupil Size (mm)  3   R Pupil Size (mm)  3          ED Medications  Medications   dicyclomine (BENTYL) tablet 20 mg (not administered)   famotidine (PEPCID) 20 mg tablet **AcuDose Override Pull** (not administered)   aluminum-magnesium hydroxide-simethicone (MYLANTA) 200-200-20 mg/5 mL oral suspension **AcuDose Override Pull** (not administered)   sodium chloride 0 9 % bolus 1,000 mL (0 mL Intravenous Stopped 9/13/18 0332)   acetaminophen (TYLENOL) tablet 650 mg (650 mg Oral Given 9/13/18 0131)   ketorolac (TORADOL) injection 15 mg (15 mg Intravenous Given 9/13/18 0132)   morphine (PF) 10 mg/mL injection 6 mg (6 mg Intravenous Given 9/13/18 0132)   iohexol (OMNIPAQUE) 350 MG/ML injection (MULTI-DOSE) 100 mL (100 mL Intravenous Given 9/13/18 0219)   aluminum-magnesium hydroxide-simethicone (MYLANTA) 200-200-20 mg/5 mL oral suspension 30 mL (30 mL Oral Given 9/13/18 0341)   famotidine (PEPCID) tablet 20 mg (20 mg Oral Given 9/13/18 0341)   dicyclomine (BENTYL) 10 mg capsule **AcuDose Override Pull** (20 mg  Given 9/13/18 0341)       Diagnostic Studies  Results Reviewed     Procedure Component Value Units Date/Time    Comprehensive metabolic panel [[id number]]  (Abnormal) Collected:  09/13/18 0125    Lab Status:  Final result Specimen:  Blood from Line, Venous Updated:  09/13/18 0148     Sodium 140 mmol/L      Potassium 5 2 (H) mmol/L      Chloride 101 mmol/L      CO2 30 mmol/L      ANION GAP 9 mmol/L      BUN 13 mg/dL      Creatinine 0 84 mg/dL      Glucose 87 mg/dL      Calcium 9 2 mg/dL      AST 40 U/L      ALT 40 U/L      Alkaline Phosphatase 68 U/L      Total Protein 8 4 g/dL      Albumin 4 8 g/dL      Total Bilirubin 1 10 mg/dL      eGFR 114 ml/min/1 73sq m     Narrative:       Hemolysis  National Kidney Disease Education Program recommendations are as follows:  GFR calculation is accurate only with a steady state creatinine  Chronic Kidney disease less than 60 ml/min/1 73 sq  meters  Kidney failure less than 15 ml/min/1 73 sq  meters  Lipase [[id number]]  (Normal) Collected:  09/13/18 0125    Lab Status:  Final result Specimen:  Blood from Line, Venous Updated:  09/13/18 0148     Lipase 167 u/L     Narrative:       Hemolysis    Lactic acid, plasma [[id number]]  (Normal) Collected:  09/13/18 0125    Lab Status:  Final result Specimen:  Blood from Line, Venous Updated:  09/13/18 0146     LACTIC ACID 1 0 mmol/L     Narrative:         Result may be elevated if tourniquet was used during collection      CBC and differential [[id number]]  (Abnormal) Collected:  09/13/18 0125    Lab Status:  Final result Specimen:  Blood from Line, Venous Updated:  09/13/18 0139     WBC 8 40 Thousand/uL      RBC 5 54 Million/uL      Hemoglobin 15 2 g/dL      Hematocrit 46 1 %      MCV 83 fL      MCH 27 5 pg      MCHC 33 0 g/dL      RDW 13 6 %      MPV 9 7 fL      Platelets 585 Thousands/uL      Neutrophils Relative 78 (H) %      Lymphocytes Relative 13 (L) %      Monocytes Relative 7 %      Eosinophils Relative 2 %      Basophils Relative 1 %      Neutrophils Absolute 6 50 Thousands/µL      Lymphocytes Absolute 1 10 Thousands/µL      Monocytes Absolute 0 60 Thousand/µL      Eosinophils Absolute 0 20 Thousand/µL      Basophils Absolute 0 00 Thousands/µL                  CT abdomen pelvis with contrast   Final Result by Jess Mojica DO (09/13 6589)      Postsurgical changes redemonstrated in the right colon as well as the distal ileum  Some mild wall thickening of the terminal ileum is questioned which could be related to underdistention or an infectious or inflammatory enteritis  No evidence of bowel    obstruction  L4/L5 disc bulge/herniation as described  Correlation with patient's symptoms recommended  Other nonacute findings as above  Workstation performed: [id number]                    Procedures  Procedures       Phone Contacts  ED Phone Contact    ED Course  ED Course as of Sep 13 0343   Thu Sep 13, 2018   3725 Patient states he is feeling better however on discharge requests an albuterol inhaler as he currently ran out  Currently in no acute respiratory distress, lungs sound clear  Will provide albuterol inhaler and patient will follow up with PCP for further care  6752 No EKG changes noted, creatinine within normal limits, patient given fluids Potassium: (!) 5 2   0519 Procedure Note: EKG  Date/Time: 09/13/18 3:38 AM   Performed by: Emmy Chauhan  Authorized by: Emmy Chauhan  Indications / Diagnosis: Hyperkalemia  ECG reviewed by me, the ED Provider: yes   The EKG demonstrates:  Rhythm: normal sinus  Intervals: normal intervals  Axis: normal axis  QRS/Blocks: normal QRS  ST Changes: No acute ST Changes, no STD/ZOE                                     MDM  Number of Diagnoses or Management Options  Diagnosis management comments: 59-year-old male presents for evaluation of abdominal pain and headache, abdominal pain preceded the headache and the headache started gradually     Low suspicion for acute intracranial process will treat symptomatically for headache however given history of multiple surgeries concerning for small bowel obstruction  Will obtain lab work, CT of the abdomen and pelvis and treat symptomatically  Will re-evaluate patient    CritCare Time    Disposition  Final diagnoses:   Abdominal pain   Nausea & vomiting   Headache     Time reflects when diagnosis was documented in both MDM as applicable and the Disposition within this note     Time User Action Codes Description Comment    9/13/2018  3:33 AM Antoniette Monteiro Add [R10 9] Abdominal pain     9/13/2018  3:33 AM Antoniette Monteiro Add [R11 2] Nausea & vomiting     9/13/2018  3:33 AM Antoniette Monteiro Add [R51] Headache       ED Disposition     ED Disposition Condition Comment    Discharge  Razia Ruiz discharge to home/self care      Condition at discharge: Stable        Follow-up Information     Follow up With Specialties Details Why 9 [facility] Emergency Department Emergency Medicine  If symptoms worsen [address] [address]  [address] [facility] Family Medicine Schedule an appointment as soon as possible for a visit  [address] [facility] [address] [facility]  [address] [address]  [address] [facility] [address]      Hetcor Barth,  Vascular Surgery, General Surgery Schedule an appointment as soon as possible for a visit As needed [address]  [address]            Patient's Medications   Discharge Prescriptions    ACETAMINOPHEN (TYLENOL) 500 MG TABLET    Take 1 tablet (500 mg total) by mouth every 6 (six) hours as needed for mild pain       Start Date: 9/13/2018 End Date: --       Order Dose: 500 mg       Quantity: 30 tablet    Refills: 0    ALBUTEROL (PROVENTIL HFA,VENTOLIN HFA) 90 MCG/ACT INHALER    Inhale 2 puffs every 4 (four) hours as needed for wheezing       Start Date: 9/13/2018 End Date: --       Order Dose: 2 puffs       Quantity: 1 Inhaler    Refills: 0    DICYCLOMINE (BENTYL) 20 MG TABLET    Take 1 tablet (20 mg total) by mouth 2 (two) times a day Start Date: 9/13/2018 End Date: --       Order Dose: 20 mg       Quantity: 20 tablet    Refills: 0    ONDANSETRON (ZOFRAN) 4 MG TABLET    Take 1 tablet (4 mg total) by mouth every 6 (six) hours       Start Date: 9/13/2018 End Date: --       Order Dose: 4 mg       Quantity: 12 tablet    Refills: 0     No discharge procedures on file      ED Provider  Electronically Signed by           Cesar Mccrary MD  09/13/18 0422

## 2018-09-15 ENCOUNTER — HOSPITAL ENCOUNTER (EMERGENCY)
Facility: HOSPITAL | Age: 33
Discharge: HOME | End: 2018-09-15
Attending: EMERGENCY MEDICINE | Admitting: EMERGENCY MEDICINE
Payer: MEDICAID

## 2018-09-15 VITALS
SYSTOLIC BLOOD PRESSURE: 115 MMHG | OXYGEN SATURATION: 98 % | TEMPERATURE: 98.5 F | DIASTOLIC BLOOD PRESSURE: 62 MMHG | RESPIRATION RATE: 16 BRPM | HEART RATE: 122 BPM

## 2018-09-15 DIAGNOSIS — J45.901 ASTHMA EXACERBATION: Primary | ICD-10-CM

## 2018-09-15 DIAGNOSIS — R51.9 HEADACHE: ICD-10-CM

## 2018-09-15 PROCEDURE — 94640 AIRWAY INHALATION TREATMENT: CPT

## 2018-09-15 PROCEDURE — 99283 EMERGENCY DEPT VISIT LOW MDM: CPT

## 2018-09-15 PROCEDURE — 96372 THER/PROPH/DIAG INJ SC/IM: CPT

## 2018-09-15 RX ORDER — NAPROXEN 500 MG/1
500 TABLET ORAL 2 TIMES DAILY WITH MEALS
Qty: 20 TABLET | Refills: 0 | Status: SHIPPED | OUTPATIENT
Start: 2018-09-15 | End: 2019-01-03

## 2018-09-15 RX ORDER — METOCLOPRAMIDE 10 MG/1
10 TABLET ORAL ONCE
Status: COMPLETED | OUTPATIENT
Start: 2018-09-15 | End: 2018-09-15

## 2018-09-15 RX ORDER — METOCLOPRAMIDE 10 MG/1
10 TABLET ORAL EVERY 6 HOURS
Qty: 30 TABLET | Refills: 0 | Status: SHIPPED | OUTPATIENT
Start: 2018-09-15 | End: 2019-01-03

## 2018-09-15 RX ORDER — KETOROLAC TROMETHAMINE 30 MG/ML
30 INJECTION, SOLUTION INTRAMUSCULAR; INTRAVENOUS ONCE
Status: COMPLETED | OUTPATIENT
Start: 2018-09-15 | End: 2018-09-15

## 2018-09-15 RX ORDER — PREDNISONE 20 MG/1
60 TABLET ORAL DAILY
Qty: 15 TABLET | Refills: 0 | Status: SHIPPED | OUTPATIENT
Start: 2018-09-15 | End: 2019-01-03

## 2018-09-15 RX ORDER — PREDNISONE 20 MG/1
60 TABLET ORAL ONCE
Status: COMPLETED | OUTPATIENT
Start: 2018-09-15 | End: 2018-09-15

## 2018-09-15 RX ADMIN — IPRATROPIUM BROMIDE 0.5 MG: 0.5 SOLUTION RESPIRATORY (INHALATION) at 02:44

## 2018-09-15 RX ADMIN — METOCLOPRAMIDE HYDROCHLORIDE 10 MG: 10 TABLET ORAL at 02:40

## 2018-09-15 RX ADMIN — PREDNISONE 60 MG: 20 TABLET ORAL at 02:40

## 2018-09-15 RX ADMIN — ALBUTEROL SULFATE 5 MG: 2.5 SOLUTION RESPIRATORY (INHALATION) at 02:44

## 2018-09-15 RX ADMIN — KETOROLAC TROMETHAMINE 30 MG: 30 INJECTION, SOLUTION INTRAMUSCULAR at 02:41

## 2018-09-15 NOTE — ED PROVIDER NOTES
History  Chief Complaint   Patient presents with    Headache     pt reports headache and chest tightness along with congestion  took benadryl at 11pm       27 YO male presents with URI symptoms and a headache  States he has had a sore throat, runny nose and non-productive cough  States today he has had a bitemporal headache, this began with and has been worsened by coughing  Pt denies fevers, no known sick contacts  Pt has a Hx of asthma, he has been taking albuterol treatments which have not improved his symptoms  Pt denies CP/SOB/F/C/N/V/D/C, no dysuria, burning on urination or blood in urine  History provided by:  Patient   used: No    URI   Presenting symptoms: congestion, cough, rhinorrhea and sore throat    Presenting symptoms: no fever    Congestion:     Location:  Nasal    Interferes with sleep: no      Interferes with eating/drinking: no    Cough:     Cough characteristics:  Non-productive    Severity:  Moderate    Onset quality:  Gradual    Duration:  2 days    Timing:  Constant    Progression:  Waxing and waning    Chronicity:  New  Rhinorrhea:     Quality:  Clear    Severity:  Moderate    Duration:  2 days    Timing:  Constant    Progression:  Unchanged  Sore throat:     Severity:  Moderate    Onset quality:  Gradual    Duration:  2 days    Timing:  Constant    Progression:  Unchanged  Severity:  Moderate  Onset quality:  Gradual  Duration:  2 days  Timing:  Constant  Progression:  Unchanged  Chronicity:  New  Relieved by:  Nothing  Worsened by:  Nothing  Ineffective treatments:  Nebulizer treatments  Associated symptoms: headaches    Associated symptoms: no neck pain and no sinus pain    Headaches:     Severity:  Moderate    Onset quality:  Gradual    Duration:  1 day    Timing:  Constant    Progression:  Waxing and waning    Chronicity:  New      Prior to Admission Medications   Prescriptions Last Dose Informant Patient Reported?  Taking?   acetaminophen (TYLENOL) 500 mg tablet   No Yes   Sig: Take 1 tablet (500 mg total) by mouth every 6 (six) hours as needed for mild pain   albuterol (PROVENTIL HFA,VENTOLIN HFA) 90 mcg/act inhaler   No Yes   Sig: Inhale 1-2 puffs every 6 (six) hours as needed for wheezing   albuterol (PROVENTIL HFA,VENTOLIN HFA) 90 mcg/act inhaler   No Yes   Sig: Inhale 2 puffs every 4 (four) hours as needed for wheezing   dicyclomine (BENTYL) 20 mg tablet   No Yes   Sig: Take 1 tablet (20 mg total) by mouth 2 (two) times a day   ondansetron (ZOFRAN) 4 mg tablet   No Yes   Sig: Take 1 tablet (4 mg total) by mouth every 6 (six) hours      Facility-Administered Medications: None       Past Medical History:   Diagnosis Date    Asthma     Small bowel obstruction (HCC)        Past Surgical History:   Procedure Laterality Date    ABDOMINAL SURGERY      umbilical hernia and right hernia repair; small bowel obstruction    UMBILICAL HERNIA REPAIR Right 2016       History reviewed  No pertinent family history  I have reviewed and agree with the history as documented  Social History   Substance Use Topics    Smoking status: Current Every Day Smoker     Packs/day: 0 25    Smokeless tobacco: Never Used    Alcohol use No        Review of Systems   Constitutional: Negative for fever  HENT: Positive for congestion, rhinorrhea and sore throat  Negative for dental problem and sinus pain  Eyes: Negative for visual disturbance  Respiratory: Positive for cough  Negative for shortness of breath  Cardiovascular: Negative for chest pain  Gastrointestinal: Negative for abdominal pain, nausea and vomiting  Genitourinary: Negative for dysuria and frequency  Musculoskeletal: Negative for neck pain and neck stiffness  Skin: Negative for rash  Neurological: Positive for headaches  Negative for dizziness, weakness and light-headedness  Psychiatric/Behavioral: Negative for agitation, behavioral problems and confusion     All other systems reviewed and are negative  Physical Exam  Physical Exam   Constitutional: He is oriented to person, place, and time  He appears well-developed and well-nourished  HENT:   Head: Normocephalic and atraumatic  Mouth/Throat: Oropharynx is clear and moist    Eyes: EOM are normal    Neck: Normal range of motion  Cardiovascular: Normal rate, regular rhythm and normal heart sounds  Pulmonary/Chest: Effort normal and breath sounds normal    Abdominal: Soft  There is no tenderness  Musculoskeletal: Normal range of motion  Neurological: He is alert and oriented to person, place, and time  Skin: Skin is warm and dry  Psychiatric: He has a normal mood and affect  His behavior is normal    Nursing note and vitals reviewed        Vital Signs  ED Triage Vitals   Temperature Pulse Respirations Blood Pressure SpO2   09/15/18 0214 09/15/18 0214 09/15/18 0214 09/15/18 0214 09/15/18 0214   98 5 °F (36 9 °C) 98 16 120/79 97 %      Temp Source Heart Rate Source Patient Position - Orthostatic VS BP Location FiO2 (%)   09/15/18 0214 09/15/18 0214 09/15/18 0214 09/15/18 0214 --   Oral Monitor Sitting Right arm       Pain Score       09/15/18 0241       1           Vitals:    09/15/18 0214 09/15/18 0258   BP: 120/79 115/62   Pulse: 98 (!) 122   Patient Position - Orthostatic VS: Sitting        Visual Acuity      ED Medications  Medications   albuterol inhalation solution 5 mg (5 mg Nebulization Given 9/15/18 0244)   ipratropium (ATROVENT) 0 02 % inhalation solution 0 5 mg (0 5 mg Nebulization Given 9/15/18 0244)   ketorolac (TORADOL) injection 30 mg (30 mg Intramuscular Given 9/15/18 0241)   metoclopramide (REGLAN) tablet 10 mg (10 mg Oral Given 9/15/18 0240)   predniSONE tablet 60 mg (60 mg Oral Given 9/15/18 0240)       Diagnostic Studies  Results Reviewed     None                 No orders to display              Procedures  Procedures       Phone Contacts  ED Phone Contact    ED Course  ED Course as of Sep 16 1135   Sat Sep 15, 2018 2129 States currently feeling better, still has headache but this has improved  Mild wheezing  Pt is comfortable with discharge at this time, states he has enough albuterol and does not require refills  MDM  Number of Diagnoses or Management Options  Asthma exacerbation: new and does not require workup  Headache: new and does not require workup  Diagnosis management comments: 1  URI - Pt with URI symptoms since yesterday, clear lung fields  Headache with coughing  Will give a breathing Tx, possibly steroids if pt has improvement  Will give NSAIDs for HA  Patient Progress  Patient progress: stable    CritCare Time    Disposition  Final diagnoses:   Asthma exacerbation   Headache     Time reflects when diagnosis was documented in both MDM as applicable and the Disposition within this note     Time User Action Codes Description Comment    9/15/2018  3:25 AM Bettie Ling [[id number]] Asthma exacerbation     9/15/2018  3:25 AM Bettie Ling [[id number]] Headache       ED Disposition     ED Disposition Condition Comment    Discharge  Tamica Lew discharge to home/self care      Condition at discharge: Improved      Follow-up Information    None         Discharge Medication List as of 9/15/2018  3:32 AM      START taking these medications    Details   metoclopramide (REGLAN) 10 mg tablet Take 1 tablet (10 mg total) by mouth every 6 (six) hours, Starting Sat 9/15/2018, Print      naproxen (NAPROSYN) 500 mg tablet Take 1 tablet (500 mg total) by mouth 2 (two) times a day with meals for 10 days, Starting Sat 9/15/2018, Until Tue 9/25/2018, Print      predniSONE 20 mg tablet Take 3 tablets (60 mg total) by mouth daily, Starting Sat 9/15/2018, Print         CONTINUE these medications which have NOT CHANGED    Details   acetaminophen (TYLENOL) 500 mg tablet Take 1 tablet (500 mg total) by mouth every 6 (six) hours as needed for mild pain, Starting Thu 9/13/2018, Print      !! albuterol (PROVENTIL HFA,VENTOLIN HFA) 90 mcg/act inhaler Inhale 1-2 puffs every 6 (six) hours as needed for wheezing, Starting Tue 7/24/2018, Print      !! albuterol (PROVENTIL HFA,VENTOLIN HFA) 90 mcg/act inhaler Inhale 2 puffs every 4 (four) hours as needed for wheezing, Starting Thu 9/13/2018, Print      dicyclomine (BENTYL) 20 mg tablet Take 1 tablet (20 mg total) by mouth 2 (two) times a day, Starting Thu 9/13/2018, Print      ondansetron (ZOFRAN) 4 mg tablet Take 1 tablet (4 mg total) by mouth every 6 (six) hours, Starting Thu 9/13/2018, Print       !! - Potential duplicate medications found  Please discuss with provider  No discharge procedures on file      ED Provider  Electronically Signed by           Batsheva Joyce MD  09/16/18 9186

## 2018-09-15 NOTE — DISCHARGE INSTRUCTIONS
Take the Prednisone, 3 pills daily for the next 5 days  Use the albuterol as needed for shortness of breath and wheezing  Return to the ER if your breathing worsens despite taking the medications  You can take the naprosyn and the Reglan for treatment of your headaches  Speak with your family doctor, you should be seen in the office for further evaluation  Acute Headache   WHAT YOU NEED TO KNOW:   An acute headache is pain or discomfort that starts suddenly and gets worse quickly  You may have an acute headache only when you feel stress or eat certain foods  Other acute headache pain can happen every day, and sometimes several times a day  DISCHARGE INSTRUCTIONS:   Return to the emergency department if:   · You have severe pain  · You have numbness or weakness on one side of your face or body  · You have a headache that occurs after a blow to the head, a fall, or other trauma  · You have a headache, are forgetful or confused, or have trouble speaking  · You have a headache, stiff neck, and a fever  Contact your healthcare provider if:   · You have a constant headache and are vomiting  · You have a headache each day that does not get better, even after treatment  · You have changes in your headaches, or new symptoms that occur when you have a headache  · You have questions or concerns about your condition or care  Medicines: You may need any of the following:  · Prescription pain medicine  may be given  The medicine your healthcare provider recommends will depend on the kind of headaches you have  You will need to take prescription headache medicines as directed to prevent a problem called rebound headache  These headaches happen with regular use of pain relievers for headache disorders  · NSAIDs , such as ibuprofen, help decrease swelling, pain, and fever  This medicine is available with or without a doctor's order   NSAIDs can cause stomach bleeding or kidney problems in certain people  If you take blood thinner medicine, always ask your healthcare provider if NSAIDs are safe for you  Always read the medicine label and follow directions  · Acetaminophen  decreases pain and fever  It is available without a doctor's order  Ask how much to take and how often to take it  Follow directions  Read the labels of all other medicines you are using to see if they also contain acetaminophen, or ask your doctor or pharmacist  Acetaminophen can cause liver damage if not taken correctly  Do not use more than 3 grams (3,000 milligrams) total of acetaminophen in one day  · Antidepressants  may be given for some kinds of headaches  · Take your medicine as directed  Contact your healthcare provider if you think your medicine is not helping or if you have side effects  Tell him or her if you are allergic to any medicine  Keep a list of the medicines, vitamins, and herbs you take  Include the amounts, and when and why you take them  Bring the list or the pill bottles to follow-up visits  Carry your medicine list with you in case of an emergency  Manage your symptoms:   · Apply heat or ice  on the headache area  Use a heat or ice pack  For an ice pack, you can also put crushed ice in a plastic bag  Cover the pack or bag with a towel before you apply it to your skin  Ice and heat both help decrease pain, and heat also helps decrease muscle spasms  Apply heat for 20 to 30 minutes every 2 hours  Apply ice for 15 to 20 minutes every hour  Apply heat or ice for as long and for as many days as directed  You may alternate heat and ice  · Relax your muscles  Lie down in a comfortable position and close your eyes  Relax your muscles slowly  Start at your toes and work your way up your body  · Keep a record of your headaches  Write down when your headaches start and stop  Include your symptoms and what you were doing when the headache began   Record what you ate or drank for 24 hours before the headache started  Describe the pain and where it hurts  Keep track of what you did to treat your headache and if it worked  Prevent an acute headache:   · Avoid anything that triggers an acute headache  Examples include exposure to chemicals, going to high altitude, or not getting enough sleep  Create a regular sleep routine  Go to sleep at the same time and wake up at the same time each day  Do not use electronic devices before bedtime  These may trigger a headache or prevent you from sleeping well  · Do not smoke  Nicotine and other chemicals in cigarettes and cigars can trigger an acute headache or make it worse  Ask your healthcare provider for information if you currently smoke and need help to quit  E-cigarettes or smokeless tobacco still contain nicotine  Talk to your healthcare provider before you use these products  · Limit alcohol as directed  Alcohol can trigger an acute headache or make it worse  If you have cluster headaches, do not drink alcohol during an episode  For other types of headaches, ask your healthcare provider if it is safe for you to drink alcohol  Ask how much is safe for you to drink, and how often  · Exercise as directed  Exercise can reduce tension and help with headache pain  Aim for 30 minutes of physical activity on most days of the week  Your healthcare provider can help you create an exercise plan  · Eat a variety of healthy foods  Healthy foods include fruits, vegetables, low-fat dairy products, lean meats, fish, whole grains, and cooked beans  Your healthcare provider or dietitian can help you create meals plans if you need to avoid foods that trigger headaches  Follow up with your healthcare provider as directed:  Bring your headache record with you when you see your healthcare provider  Write down your questions so you remember to ask them during your visits    © 2017 [address] Information is for End User's use only and may not be sold, redistributed or otherwise used for commercial purposes  All illustrations and images included in CareNotes® are the copyrighted property of [employer]  or [employer]  The above information is an  only  It is not intended as medical advice for individual conditions or treatments  Talk to your doctor, nurse or pharmacist before following any medical regimen to see if it is safe and effective for you  Asthma, Ambulatory Care   GENERAL INFORMATION:   Asthma  is a lung disease that makes breathing difficult  Chronic inflammation and reactions to triggers narrow the airways in your lungs  Asthma can become life-threatening if it is not managed  Common symptoms include the following:   · Coughing     · Wheezing     · Shortness of breath     · Chest tightness  Seek immediate care for the following symptoms:   · Severe shortness of breath    · Blue or gray lips or nails    · Skin around your neck and ribs pulls in with each breath    · Shortness of breath, even after you take your short-term medicine as directed     · Peak flow numbers in the red zone of your asthma action plan  Treatment for asthma  will depend on how severe it is  Medicine may decrease inflammation, open airways, and make it easier to breathe  Medicines may be inhaled, taken as a pill, or injected  Short-term medicines relieve your symptoms quickly  Long-term medicines are used to prevent future attacks  You may also need medicine to help control your allergies  Manage and prevent future asthma attacks:   · Follow your asthma action pan  This is a written plan that you and your healthcare provider create  It explains which medicine you need and when to change doses if necessary  It also explains how you can monitor symptoms and use a peak flow meter  The meter measures how well your lungs are working  · Manage other health conditions , such as allergies, acid reflux, and sleep apnea       · Identify and avoid triggers  These may include pets, dust mites, mold, and cockroaches  · Do not smoke and avoid others who smoke  If you smoke, it is never too late to quit  Ask your healthcare provider if you need help quitting  · Ask about a flu vaccine  The flu can make your asthma worse  You may need a yearly flu shot  Follow up with your healthcare provider as directed: You will need to return to make sure your medicine is working and your symptoms are controlled  You may be referred to an asthma or allergy specialist  [employer] Back may be asked to keep a record of your peak flow values and bring it with you to your appointments  Write down your questions so you remember to ask them during your visits  CARE AGREEMENT:   You have the right to help plan your care  Learn about your health condition and how it may be treated  Discuss treatment options with your caregivers to decide what care you want to receive  You always have the right to refuse treatment  The above information is an  only  It is not intended as medical advice for individual conditions or treatments  Talk to your doctor, nurse or pharmacist before following any medical regimen to see if it is safe and effective for you  © 2014 [address] is for End User's use only and may not be sold, redistributed or otherwise used for commercial purposes  All illustrations and images included in CareNotes® are the copyrighted property of [employer]  or [employer] Joseph

## 2019-01-02 ENCOUNTER — HOSPITAL ENCOUNTER (EMERGENCY)
Facility: HOSPITAL | Age: 34
Discharge: HOME | End: 2019-01-03
Attending: EMERGENCY MEDICINE | Admitting: EMERGENCY MEDICINE
Payer: COMMERCIAL

## 2019-01-02 VITALS
SYSTOLIC BLOOD PRESSURE: 115 MMHG | HEART RATE: 90 BPM | TEMPERATURE: 97.9 F | WEIGHT: 190 LBS | BODY MASS INDEX: 27.26 KG/M2 | RESPIRATION RATE: 20 BRPM | DIASTOLIC BLOOD PRESSURE: 72 MMHG | OXYGEN SATURATION: 98 %

## 2019-01-02 DIAGNOSIS — B96.89 ACUTE BACTERIAL BRONCHITIS: Primary | ICD-10-CM

## 2019-01-02 DIAGNOSIS — J45.909 ASTHMA: ICD-10-CM

## 2019-01-02 DIAGNOSIS — J20.8 ACUTE BACTERIAL BRONCHITIS: Primary | ICD-10-CM

## 2019-01-02 PROCEDURE — 99283 EMERGENCY DEPT VISIT LOW MDM: CPT

## 2019-01-02 PROCEDURE — 94640 AIRWAY INHALATION TREATMENT: CPT

## 2019-01-03 RX ORDER — ALBUTEROL SULFATE 90 UG/1
2 AEROSOL, METERED RESPIRATORY (INHALATION) EVERY 4 HOURS PRN
Qty: 1 INHALER | Refills: 0 | Status: SHIPPED | OUTPATIENT
Start: 2019-01-03 | End: 2020-03-15 | Stop reason: SDUPTHER

## 2019-01-03 RX ORDER — IPRATROPIUM BROMIDE AND ALBUTEROL SULFATE 2.5; .5 MG/3ML; MG/3ML
3 SOLUTION RESPIRATORY (INHALATION) ONCE
Status: COMPLETED | OUTPATIENT
Start: 2019-01-03 | End: 2019-01-03

## 2019-01-03 RX ORDER — AZITHROMYCIN 250 MG/1
500 TABLET, FILM COATED ORAL ONCE
Status: COMPLETED | OUTPATIENT
Start: 2019-01-03 | End: 2019-01-03

## 2019-01-03 RX ORDER — PREDNISONE 20 MG/1
60 TABLET ORAL DAILY
Qty: 15 TABLET | Refills: 0 | Status: SHIPPED | OUTPATIENT
Start: 2019-01-03 | End: 2019-01-08

## 2019-01-03 RX ORDER — PREDNISONE 20 MG/1
60 TABLET ORAL ONCE
Status: COMPLETED | OUTPATIENT
Start: 2019-01-03 | End: 2019-01-03

## 2019-01-03 RX ORDER — AZITHROMYCIN 250 MG/1
TABLET, FILM COATED ORAL
Qty: 4 TABLET | Refills: 0 | Status: SHIPPED | OUTPATIENT
Start: 2019-01-03 | End: 2019-01-07

## 2019-01-03 RX ADMIN — IPRATROPIUM BROMIDE AND ALBUTEROL SULFATE 3 ML: 2.5; .5 SOLUTION RESPIRATORY (INHALATION) at 01:00

## 2019-01-03 RX ADMIN — AZITHROMYCIN 500 MG: 250 TABLET, FILM COATED ORAL at 00:59

## 2019-01-03 RX ADMIN — PREDNISONE 60 MG: 20 TABLET ORAL at 00:59

## 2019-01-03 NOTE — DISCHARGE INSTRUCTIONS
Acute Bronchitis   WHAT YOU SHOULD KNOW:   Acute bronchitis is swelling and irritation in the air passages of your lungs  This irritation may cause you to cough or have other breathing problems  Acute bronchitis often starts because of another viral illness, such as a cold or the flu  The illness spreads from your nose and throat to your windpipe and airways  Bronchitis is often called a chest cold  Acute bronchitis lasts about 2 weeks and is usually not a serious illness  AFTER YOU LEAVE:   Medicines:   · Ibuprofen or acetaminophen:  These medicines help lower a fever  They are available without a doctor's order  Ask your healthcare provider which medicine is right for you  Ask how much to take and how often to take it  Follow directions  These medicines can cause stomach bleeding if not taken correctly  Ibuprofen can cause kidney damage  Do not take ibuprofen if you have kidney disease, an ulcer, or allergies to aspirin  Acetaminophen can cause liver damage  Do not drink alcohol if you take acetaminophen  · Cough medicine: This medicine helps loosen mucus in your lungs and make it easier to cough up  This can help you breathe easier  · Inhalers: You may need one or more inhalers to help you breathe easier and cough less  An inhaler gives your medicine in a mist form so that you can breathe it into your lungs  Ask your healthcare provider to show you how to use your inhaler correctly  · Steroid medicine:  Steroid medicine helps open your air passages so you can breathe easier  · Take your medicine as directed  Call your healthcare provider if you think your medicine is not helping or if you have side effects  Tell him if you are allergic to any medicine  Keep a list of the medicines, vitamins, and herbs you take  Include the amounts, and when and why you take them  Bring the list or the pill bottles to follow-up visits  Carry your medicine list with you in case of an emergency    How to use an inhaler:   · Shake the inhaler well to make sure you get the correct amount of medicine per puff  Remove the cover from your inhaler's mouthpiece  If you are using a spacer, connect your inhaler to the flat end of the spacer  · Exhale as much air from your lungs as you can  Put the mouthpiece in your mouth past your front teeth and rest it on the top of your tongue  Do not block the mouthpiece opening with your tongue  · Breathe in through your mouth at a slow and steady rate  As you do this, press the inhaler to release the puff of medicine  Finish breathing in slowly and deeply as you inhale the medicine  When your lungs are full, hold your breath for 10 seconds  Then breathe out slowly through puckered lips or through your nose  · If you need to take more puffs, wait at least 1 minute between each puff  · Rinse your mouth with water after you use the inhaler  This may keep you from getting a mouth infection or irritation  · Follow the instructions that come with your inhaler to clean it  You should clean your inhaler at least once a week  Ways to care for yourself:   · Avoid alcohol:  Alcohol dulls your urge to cough and sneeze  When you have bronchitis, you need to be able to cough and sneeze to clear your air passages  Alcohol also causes your body to lose fluid  This can make the mucus in your lungs thicker and harder to cough up  · Avoid irritants in the air:  Do not smoke or allow others to smoke around you  Avoid chemicals, fumes, and dust  Wear a face mask if you must work around dust or fumes  Stay inside on days when air pollution levels are high  If you have allergies, stay inside when pollen counts are high  Avoid aerosol products  This includes spray-on deodorant, bug spray, and hair spray  · Drink more liquids:  Most people should drink at least 8 eight-ounce cups of water a day  You may need to drink more liquids when you have acute bronchitis   Liquids help keep your air passages moist and help you cough up mucus  · Get more rest:  You may feel like resting more  Slowly start to do more each day  Rest when you feel it is needed  · Eat healthy foods:  Eat a variety healthy foods every day  Your diet should include fruits, vegetables, breads, and protein (such as chicken, fish, and beans)  Dairy products (such as milk, cheese, and ice cream) can sometimes increase the amount of mucus your body makes  Ask if you should decrease your intake of dairy products  · Use a humidifier:  Use a cool mist humidifier to increase air moisture in your home  This may make it easier for you to breathe and help decrease your cough  Decrease your risk of acute bronchitis:   · Get the vaccinations you need:  Ask your healthcare provider if you should get vaccinated against the flu or pneumonia  · Avoid things that may irritate your lungs:  Stay inside or cover your mouth and nose with a scarf when you are outside during cold weather  You should also stay inside on days when air pollution levels are high  If you have allergies, stay inside when pollen counts are high  Avoid using aerosol products in your home  This includes spray-on deodorant, bug spray, and hair spray  · Avoid the spread of germs:        [facility] your hands often with soap and water  Carry germ-killing gel with you  You can use the gel to clean your hands when there is no soap and water available  ¨ Do not touch your eyes, nose, or mouth unless you have washed your hands first     ¨ Always cover your mouth when you cough  Cough into a tissue or your shirtsleeve so you do not spread germs from your hands  ¨ Try to avoid people who have a cold or the flu  If you are sick, stay away from others as much as possible  Follow up with your healthcare provider as directed:  Write down questions you have so you will remember to ask them during your follow-up visits    Contact your healthcare provider if:   · You have a fever     · Your skin becomes itchy or you have a rash after you take your medicine  · Your breathing problems do not go away or get worse  · Your cough does not get better with treatment  · You cough up blood  · You have questions or concerns about your condition or care  Seek care immediately or call 911 if:   · You faint  · Your lips or fingernails turn blue  · You feel like you are not getting enough air when you breathe  · You have swelling of your lips, tongue, or throat that makes it hard to breathe or swallow  © 2014 [address] [employer] is for End User's use only and may not be sold, redistributed or otherwise used for commercial purposes  All illustrations and images included in CareNotes® are the copyrighted property of [employer]  or Curtis Joseph  The above information is an  only  It is not intended as medical advice for individual conditions or treatments  Talk to your doctor, nurse or pharmacist before following any medical regimen to see if it is safe and effective for you  Asthma, Ambulatory Care   GENERAL INFORMATION:   Asthma  is a lung disease that makes breathing difficult  Chronic inflammation and reactions to triggers narrow the airways in your lungs  Asthma can become life-threatening if it is not managed  Common symptoms include the following:   · Coughing     · Wheezing     · Shortness of breath     · Chest tightness  Seek immediate care for the following symptoms:   · Severe shortness of breath    · Blue or gray lips or nails    · Skin around your neck and ribs pulls in with each breath    · Shortness of breath, even after you take your short-term medicine as directed     · Peak flow numbers in the red zone of your asthma action plan  Treatment for asthma  will depend on how severe it is  Medicine may decrease inflammation, open airways, and make it easier to breathe   Medicines may be inhaled, taken as a pill, or injected  Short-term medicines relieve your symptoms quickly  Long-term medicines are used to prevent future attacks  You may also need medicine to help control your allergies  Manage and prevent future asthma attacks:   · Follow your asthma action pan  This is a written plan that you and your healthcare provider create  It explains which medicine you need and when to change doses if necessary  It also explains how you can monitor symptoms and use a peak flow meter  The meter measures how well your lungs are working  · Manage other health conditions , such as allergies, acid reflux, and sleep apnea  · Identify and avoid triggers  These may include pets, dust mites, mold, and cockroaches  · Do not smoke and avoid others who smoke  If you smoke, it is never too late to quit  Ask your healthcare provider if you need help quitting  · Ask about a flu vaccine  The flu can make your asthma worse  You may need a yearly flu shot  Follow up with your healthcare provider as directed: You will need to return to make sure your medicine is working and your symptoms are controlled  You may be referred to an asthma or allergy specialist  Juventino Mattson may be asked to keep a record of your peak flow values and bring it with you to your appointments  Write down your questions so you remember to ask them during your visits  CARE AGREEMENT:   You have the right to help plan your care  Learn about your health condition and how it may be treated  Discuss treatment options with your caregivers to decide what care you want to receive  You always have the right to refuse treatment  The above information is an  only  It is not intended as medical advice for individual conditions or treatments  Talk to your doctor, nurse or pharmacist before following any medical regimen to see if it is safe and effective for you    © 2014 [address] is for End User's use only and may not be sold, redistributed or otherwise used for commercial purposes  All illustrations and images included in CareNotes® are the copyrighted property of A D A M [employer]  or Curtis Joseph

## 2019-01-03 NOTE — ED PROVIDER NOTES
History  Chief Complaint   Patient presents with    Cough     patient requesting refill for "pump" for asthma  URI symptoms reported as well with cough and congestion  yellowish output with cough  This is a 28year old male who smokes and has asthma and states for the last several days he has had green productive cough with chest congestion, wheezing  He denies fevers, chills, n/v/d  He has been using his MDI and it is out  He has not tried anything for his symptoms  History provided by:  Patient and medical records  Cough   Cough characteristics:  Productive  Sputum characteristics:  Green  Severity:  Moderate  Onset quality:  Gradual  Timing:  Constant  Progression:  Worsening  Chronicity:  New  Smoker: yes    Ineffective treatments:  Beta-agonist inhaler  Associated symptoms: shortness of breath and wheezing    Risk factors: recent infection        Prior to Admission Medications   Prescriptions Last Dose Informant Patient Reported? Taking? albuterol (PROVENTIL HFA,VENTOLIN HFA) 90 mcg/act inhaler   No Yes   Sig: Inhale 2 puffs every 4 (four) hours as needed for wheezing      Facility-Administered Medications: None       Past Medical History:   Diagnosis Date    Asthma     Small bowel obstruction (HCC)        Past Surgical History:   Procedure Laterality Date    ABDOMINAL SURGERY      umbilical hernia and right hernia repair; small bowel obstruction    UMBILICAL HERNIA REPAIR Right 2016       History reviewed  No pertinent family history  I have reviewed and agree with the history as documented  Social History   Substance Use Topics    Smoking status: Current Every Day Smoker     Packs/day: 0 25    Smokeless tobacco: Never Used    Alcohol use No        Review of Systems   Constitutional: Negative  HENT: Negative  Eyes: Negative  Respiratory: Positive for cough, shortness of breath and wheezing  Cardiovascular: Negative  Gastrointestinal: Negative  Endocrine: Negative  Genitourinary: Negative  Musculoskeletal: Negative  Skin: Negative  Allergic/Immunologic: Negative  Neurological: Negative  Hematological: Negative  Psychiatric/Behavioral: Negative  Physical Exam  Physical Exam   Constitutional: He is oriented to person, place, and time  He appears well-developed and well-nourished  He appears distressed  HENT:   Head: Normocephalic and atraumatic  Right Ear: External ear normal    Left Ear: External ear normal    Oropharynx injected     Eyes: Pupils are equal, round, and reactive to light  EOM are normal    Neck: Normal range of motion  Neck supple  Cardiovascular: Normal rate, regular rhythm and normal heart sounds  Pulmonary/Chest: Effort normal    + wheezes through out with tightness    Abdominal: Soft  Bowel sounds are normal  He exhibits no distension  There is no tenderness  Musculoskeletal: Normal range of motion  Neurological: He is alert and oriented to person, place, and time  Skin: Skin is warm and dry  Capillary refill takes less than 2 seconds  He is not diaphoretic  Psychiatric: He has a normal mood and affect  His behavior is normal  Judgment and thought content normal    Nursing note and vitals reviewed        Vital Signs  ED Triage Vitals [01/02/19 2311]   Temperature Pulse Respirations Blood Pressure SpO2   97 9 °F (36 6 °C) 90 20 115/72 98 %      Temp src Heart Rate Source Patient Position - Orthostatic VS BP Location FiO2 (%)   -- Monitor Sitting Right arm --      Pain Score       No Pain           Vitals:    01/02/19 2311   BP: 115/72   Pulse: 90   Patient Position - Orthostatic VS: Sitting       Visual Acuity      ED Medications  Medications   ipratropium-albuterol (DUO-NEB) 0 5-2 5 mg/3 mL inhalation solution 3 mL (3 mL Nebulization Given 1/3/19 0100)   predniSONE tablet 60 mg (60 mg Oral Given 1/3/19 0059)   azithromycin (ZITHROMAX) tablet 500 mg (500 mg Oral Given 1/3/19 0059)       Diagnostic Studies  Results Reviewed     None                 No orders to display              Procedures  Procedures       Phone Contacts  ED Phone Contact    ED Course          1:15 AM  Reassessment: lungs are CTA after duoneb  Pt verbalizes understanding of all d/c instructions and follow up  MDM  Number of Diagnoses or Management Options  Diagnosis management comments: Asthma exacerbation  Bronchitis    Plan  duoneb  Prednisone  Azithromycin    Pt verbalizes understanding of all d/cinstructions and follow up        Amount and/or Complexity of Data Reviewed  Review and summarize past medical records: yes      CritCare Time    Disposition  Final diagnoses:   Acute bacterial bronchitis   Asthma     Time reflects when diagnosis was documented in both MDM as applicable and the Disposition within this note     Time User Action Codes Description Comment    1/3/2019 12:56 AM Anatoly Backers Add [J20 8,  B96 89] Acute bacterial bronchitis     1/3/2019 12:56 AM Anatoly Backers Add [J45 [id number]] Asthma       ED Disposition     ED Disposition Condition Comment    Discharge  Cal Class discharge to home/self care      Condition at discharge: Good        Follow-up Information     Follow up With Specialties Details Why Contact Info Additional [facility] [place] [facility] Schedule an appointment as soon as possible for a visit in 2 days you a re to find a PCP and follow up with them  [address] [place] [address] [address] [phone] [facility] Emergency Department Emergency Medicine  If symptoms worsen [address] [facility]  [phone] [place] [facility], [address] [place] [address], [phone]          Patient's Medications   Discharge Prescriptions    ALBUTEROL (PROVENTIL HFA,VENTOLIN HFA) 90 MCG/ACT INHALER    Inhale 2 puffs every 4 (four) hours as needed for wheezing or shortness of breath       Start Date: 1/3/2019  End Date: --       Order Dose: 2 puffs       Quantity: 1 Inhaler    Refills: 0    AZITHROMYCIN (ZITHROMAX) 250 MG TABLET    Take  1 tablet daily x 4 days       Start Date: 1/3/2019  End Date: 1/7/2019       Order Dose: --       Quantity: 4 tablet    Refills: 0    PREDNISONE 20 MG TABLET    Take 3 tablets (60 mg total) by mouth daily for 5 days       Start Date: 1/3/2019  End Date: 1/8/2019       Order Dose: 60 mg       Quantity: 15 tablet    Refills: 0     No discharge procedures on file      ED Provider  Electronically Signed by           Isha Mitchell  01/03/19 5776

## 2019-05-17 ENCOUNTER — APPOINTMENT (EMERGENCY)
Dept: RADIOLOGY | Facility: HOSPITAL | Age: 34
End: 2019-05-17
Payer: COMMERCIAL

## 2019-05-17 ENCOUNTER — HOSPITAL ENCOUNTER (EMERGENCY)
Facility: HOSPITAL | Age: 34
Discharge: HOME | End: 2019-05-17
Attending: EMERGENCY MEDICINE | Admitting: EMERGENCY MEDICINE
Payer: COMMERCIAL

## 2019-05-17 VITALS
RESPIRATION RATE: 18 BRPM | HEART RATE: 99 BPM | BODY MASS INDEX: 27.27 KG/M2 | TEMPERATURE: 98.1 F | SYSTOLIC BLOOD PRESSURE: 133 MMHG | DIASTOLIC BLOOD PRESSURE: 60 MMHG | WEIGHT: 190.04 LBS | OXYGEN SATURATION: 100 %

## 2019-05-17 DIAGNOSIS — J45.901 ASTHMA EXACERBATION: Primary | ICD-10-CM

## 2019-05-17 PROCEDURE — 71046 X-RAY EXAM CHEST 2 VIEWS: CPT

## 2019-05-17 PROCEDURE — 99283 EMERGENCY DEPT VISIT LOW MDM: CPT

## 2019-05-17 PROCEDURE — 94640 AIRWAY INHALATION TREATMENT: CPT

## 2019-05-17 PROCEDURE — 99284 EMERGENCY DEPT VISIT MOD MDM: CPT | Performed by: PHYSICIAN ASSISTANT

## 2019-05-17 RX ORDER — ALBUTEROL SULFATE 90 UG/1
2 AEROSOL, METERED RESPIRATORY (INHALATION) EVERY 4 HOURS PRN
Qty: 1 INHALER | Refills: 0 | Status: SHIPPED | OUTPATIENT
Start: 2019-05-17 | End: 2020-03-15 | Stop reason: SDUPTHER

## 2019-05-17 RX ORDER — PREDNISONE 10 MG/1
TABLET ORAL
Qty: 20 TABLET | Refills: 0 | Status: SHIPPED | OUTPATIENT
Start: 2019-05-17 | End: 2020-03-15 | Stop reason: ALTCHOICE

## 2019-05-17 RX ORDER — ALBUTEROL SULFATE 2.5 MG/3ML
2.5 SOLUTION RESPIRATORY (INHALATION) EVERY 6 HOURS PRN
Qty: 75 ML | Refills: 0 | Status: SHIPPED | OUTPATIENT
Start: 2019-05-17

## 2019-05-17 RX ORDER — ALBUTEROL SULFATE 2.5 MG/3ML
5 SOLUTION RESPIRATORY (INHALATION) ONCE
Status: COMPLETED | OUTPATIENT
Start: 2019-05-17 | End: 2019-05-17

## 2019-05-17 RX ADMIN — IPRATROPIUM BROMIDE 0.5 MG: 0.5 SOLUTION RESPIRATORY (INHALATION) at 20:18

## 2019-05-17 RX ADMIN — ALBUTEROL SULFATE 5 MG: 2.5 SOLUTION RESPIRATORY (INHALATION) at 20:18

## 2020-03-15 ENCOUNTER — APPOINTMENT (EMERGENCY)
Dept: CT IMAGING | Facility: HOSPITAL | Age: 35
End: 2020-03-15

## 2020-03-15 ENCOUNTER — HOSPITAL ENCOUNTER (EMERGENCY)
Facility: HOSPITAL | Age: 35
Discharge: HOME | End: 2020-03-15
Attending: EMERGENCY MEDICINE | Admitting: EMERGENCY MEDICINE

## 2020-03-15 VITALS
RESPIRATION RATE: 18 BRPM | BODY MASS INDEX: 27.3 KG/M2 | WEIGHT: 190.26 LBS | TEMPERATURE: 98.5 F | DIASTOLIC BLOOD PRESSURE: 69 MMHG | SYSTOLIC BLOOD PRESSURE: 120 MMHG | HEART RATE: 74 BPM | OXYGEN SATURATION: 97 %

## 2020-03-15 DIAGNOSIS — R11.2 NAUSEA AND VOMITING, INTRACTABILITY OF VOMITING NOT SPECIFIED, UNSPECIFIED VOMITING TYPE: ICD-10-CM

## 2020-03-15 DIAGNOSIS — D72.829 LEUKOCYTOSIS, UNSPECIFIED TYPE: ICD-10-CM

## 2020-03-15 DIAGNOSIS — K59.00 CONSTIPATION, UNSPECIFIED CONSTIPATION TYPE: ICD-10-CM

## 2020-03-15 DIAGNOSIS — K21.00 GASTROESOPHAGEAL REFLUX DISEASE WITH ESOPHAGITIS: ICD-10-CM

## 2020-03-15 DIAGNOSIS — Z87.09 HISTORY OF ASTHMA: ICD-10-CM

## 2020-03-15 DIAGNOSIS — R10.31 RIGHT LOWER QUADRANT ABDOMINAL PAIN: Primary | ICD-10-CM

## 2020-03-15 LAB
ALBUMIN SERPL BCP-MCNC: 4.2 G/DL (ref 3.5–5)
ALP SERPL-CCNC: 80 U/L (ref 46–116)
ALT SERPL W P-5'-P-CCNC: 58 U/L (ref 12–78)
ANION GAP SERPL CALCULATED.3IONS-SCNC: 11 MMOL/L (ref 4–13)
APTT PPP: 28 SECONDS (ref 23–37)
AST SERPL W P-5'-P-CCNC: 22 U/L (ref 5–45)
BACTERIA UR QL AUTO: ABNORMAL /HPF
BASOPHILS # BLD AUTO: 0.02 THOUSANDS/ΜL (ref 0–0.1)
BASOPHILS NFR BLD AUTO: 0 % (ref 0–1)
BILIRUB SERPL-MCNC: 0.69 MG/DL (ref 0.2–1)
BILIRUB UR QL STRIP: NEGATIVE
BUN SERPL-MCNC: 16 MG/DL (ref 5–25)
CALCIUM SERPL-MCNC: 9 MG/DL (ref 8.3–10.1)
CHLORIDE SERPL-SCNC: 103 MMOL/L (ref 100–108)
CLARITY UR: CLEAR
CO2 SERPL-SCNC: 25 MMOL/L (ref 21–32)
COLOR UR: YELLOW
CREAT SERPL-MCNC: 1.12 MG/DL (ref 0.6–1.3)
EOSINOPHIL # BLD AUTO: 0.05 THOUSAND/ΜL (ref 0–0.61)
EOSINOPHIL NFR BLD AUTO: 0 % (ref 0–6)
ERYTHROCYTE [DISTWIDTH] IN BLOOD BY AUTOMATED COUNT: 13.4 % (ref 11.6–15.1)
GFR SERPL CREATININE-BSD FRML MDRD: 85 ML/MIN/1.73SQ M
GLUCOSE SERPL-MCNC: 112 MG/DL (ref 65–140)
GLUCOSE UR STRIP-MCNC: NEGATIVE MG/DL
HCT VFR BLD AUTO: 48.8 % (ref 36.5–49.3)
HGB BLD-MCNC: 15.4 G/DL (ref 12–17)
HGB UR QL STRIP.AUTO: ABNORMAL
IMM GRANULOCYTES # BLD AUTO: 0.04 THOUSAND/UL (ref 0–0.2)
IMM GRANULOCYTES NFR BLD AUTO: 0 % (ref 0–2)
INR PPP: 0.97 (ref 0.84–1.19)
KETONES UR STRIP-MCNC: NEGATIVE MG/DL
LACTATE SERPL-SCNC: 1.4 MMOL/L (ref 0.5–2)
LEUKOCYTE ESTERASE UR QL STRIP: NEGATIVE
LIPASE SERPL-CCNC: 151 U/L (ref 73–393)
LYMPHOCYTES # BLD AUTO: 1.44 THOUSANDS/ΜL (ref 0.6–4.47)
LYMPHOCYTES NFR BLD AUTO: 12 % (ref 14–44)
MAGNESIUM SERPL-MCNC: 1.9 MG/DL (ref 1.6–2.6)
MCH RBC QN AUTO: 27.2 PG (ref 26.8–34.3)
MCHC RBC AUTO-ENTMCNC: 31.6 G/DL (ref 31.4–37.4)
MCV RBC AUTO: 86 FL (ref 82–98)
MONOCYTES # BLD AUTO: 0.82 THOUSAND/ΜL (ref 0.17–1.22)
MONOCYTES NFR BLD AUTO: 7 % (ref 4–12)
MUCOUS THREADS UR QL AUTO: ABNORMAL
NEUTROPHILS # BLD AUTO: 9.49 THOUSANDS/ΜL (ref 1.85–7.62)
NEUTS SEG NFR BLD AUTO: 81 % (ref 43–75)
NITRITE UR QL STRIP: NEGATIVE
NON-SQ EPI CELLS URNS QL MICRO: ABNORMAL /HPF
NRBC BLD AUTO-RTO: 0 /100 WBCS
PH UR STRIP.AUTO: 6 [PH] (ref 4.5–8)
PLATELET # BLD AUTO: 276 THOUSANDS/UL (ref 149–390)
PMV BLD AUTO: 11 FL (ref 8.9–12.7)
POTASSIUM SERPL-SCNC: 4.1 MMOL/L (ref 3.5–5.3)
PROT SERPL-MCNC: 7.8 G/DL (ref 6.4–8.2)
PROT UR STRIP-MCNC: NEGATIVE MG/DL
PROTHROMBIN TIME: 13 SECONDS (ref 11.6–14.5)
RBC # BLD AUTO: 5.66 MILLION/UL (ref 3.88–5.62)
RBC #/AREA URNS AUTO: ABNORMAL /HPF
SODIUM SERPL-SCNC: 139 MMOL/L (ref 136–145)
SP GR UR STRIP.AUTO: 1.02 (ref 1–1.03)
UROBILINOGEN UR QL STRIP.AUTO: 0.2 E.U./DL
WBC # BLD AUTO: 11.86 THOUSAND/UL (ref 4.31–10.16)
WBC #/AREA URNS AUTO: ABNORMAL /HPF

## 2020-03-15 PROCEDURE — 99284 EMERGENCY DEPT VISIT MOD MDM: CPT

## 2020-03-15 PROCEDURE — 80053 COMPREHEN METABOLIC PANEL: CPT | Performed by: NURSE PRACTITIONER

## 2020-03-15 PROCEDURE — 85730 THROMBOPLASTIN TIME PARTIAL: CPT | Performed by: NURSE PRACTITIONER

## 2020-03-15 PROCEDURE — 81001 URINALYSIS AUTO W/SCOPE: CPT

## 2020-03-15 PROCEDURE — 96376 TX/PRO/DX INJ SAME DRUG ADON: CPT

## 2020-03-15 PROCEDURE — 96361 HYDRATE IV INFUSION ADD-ON: CPT

## 2020-03-15 PROCEDURE — 96374 THER/PROPH/DIAG INJ IV PUSH: CPT

## 2020-03-15 PROCEDURE — 83605 ASSAY OF LACTIC ACID: CPT | Performed by: NURSE PRACTITIONER

## 2020-03-15 PROCEDURE — 36415 COLL VENOUS BLD VENIPUNCTURE: CPT | Performed by: NURSE PRACTITIONER

## 2020-03-15 PROCEDURE — 85025 COMPLETE CBC W/AUTO DIFF WBC: CPT | Performed by: NURSE PRACTITIONER

## 2020-03-15 PROCEDURE — 74177 CT ABD & PELVIS W/CONTRAST: CPT

## 2020-03-15 PROCEDURE — 85610 PROTHROMBIN TIME: CPT | Performed by: NURSE PRACTITIONER

## 2020-03-15 PROCEDURE — 96375 TX/PRO/DX INJ NEW DRUG ADDON: CPT

## 2020-03-15 PROCEDURE — 83735 ASSAY OF MAGNESIUM: CPT | Performed by: NURSE PRACTITIONER

## 2020-03-15 PROCEDURE — 83690 ASSAY OF LIPASE: CPT | Performed by: NURSE PRACTITIONER

## 2020-03-15 PROCEDURE — 99285 EMERGENCY DEPT VISIT HI MDM: CPT | Performed by: EMERGENCY MEDICINE

## 2020-03-15 RX ORDER — HYDROMORPHONE HCL/PF 1 MG/ML
1 SYRINGE (ML) INJECTION ONCE
Status: COMPLETED | OUTPATIENT
Start: 2020-03-15 | End: 2020-03-15

## 2020-03-15 RX ORDER — DOCUSATE SODIUM 100 MG/1
100 CAPSULE, LIQUID FILLED ORAL ONCE
Status: COMPLETED | OUTPATIENT
Start: 2020-03-15 | End: 2020-03-15

## 2020-03-15 RX ORDER — OMEPRAZOLE 20 MG/1
20 CAPSULE, DELAYED RELEASE ORAL DAILY
Qty: 20 CAPSULE | Refills: 0 | Status: SHIPPED | OUTPATIENT
Start: 2020-03-15

## 2020-03-15 RX ORDER — ONDANSETRON 2 MG/ML
4 INJECTION INTRAMUSCULAR; INTRAVENOUS ONCE
Status: COMPLETED | OUTPATIENT
Start: 2020-03-15 | End: 2020-03-15

## 2020-03-15 RX ORDER — ONDANSETRON 4 MG/1
4 TABLET, FILM COATED ORAL EVERY 8 HOURS PRN
Qty: 12 TABLET | Refills: 0 | Status: SHIPPED | OUTPATIENT
Start: 2020-03-15

## 2020-03-15 RX ORDER — ALBUTEROL SULFATE 90 UG/1
2 AEROSOL, METERED RESPIRATORY (INHALATION) EVERY 4 HOURS PRN
Qty: 1 INHALER | Refills: 0 | Status: SHIPPED | OUTPATIENT
Start: 2020-03-15

## 2020-03-15 RX ADMIN — HYDROMORPHONE HYDROCHLORIDE 1 MG: 1 INJECTION, SOLUTION INTRAMUSCULAR; INTRAVENOUS; SUBCUTANEOUS at 03:35

## 2020-03-15 RX ADMIN — SODIUM CHLORIDE 1000 ML: 0.9 INJECTION, SOLUTION INTRAVENOUS at 01:05

## 2020-03-15 RX ADMIN — IOHEXOL 50 ML: 240 INJECTION, SOLUTION INTRATHECAL; INTRAVASCULAR; INTRAVENOUS; ORAL at 02:14

## 2020-03-15 RX ADMIN — DOCUSATE SODIUM 100 MG: 100 CAPSULE, LIQUID FILLED ORAL at 04:24

## 2020-03-15 RX ADMIN — ONDANSETRON 4 MG: 2 INJECTION INTRAMUSCULAR; INTRAVENOUS at 03:58

## 2020-03-15 RX ADMIN — IOHEXOL 100 ML: 350 INJECTION, SOLUTION INTRAVENOUS at 02:14

## 2020-03-15 RX ADMIN — HYDROMORPHONE HYDROCHLORIDE 1 MG: 1 INJECTION, SOLUTION INTRAMUSCULAR; INTRAVENOUS; SUBCUTANEOUS at 01:08

## 2020-03-15 RX ADMIN — ONDANSETRON 4 MG: 2 INJECTION INTRAMUSCULAR; INTRAVENOUS at 01:06
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
## 2020-03-15 NOTE — ED NOTES
Pt requesting pain meds at this time  Angela Segura made aware        Yvan Jernigan, AMARILYS  03/15/20 1387

## 2020-03-15 NOTE — DISCHARGE INSTRUCTIONS
You have been prescribed zofran for nausea and vomiting, omeprazole for GERD  At your request you asked for a refill of your asthma pump    You are to take a colace or stool softener tablet daily to prevent constipation  You are to follow up with your PCP

## 2023-07-10 ENCOUNTER — HOSPITAL ENCOUNTER (EMERGENCY)
Facility: HOSPITAL | Age: 38
Discharge: HOME | End: 2023-07-11
Attending: EMERGENCY MEDICINE

## 2023-07-10 DIAGNOSIS — K52.9 ENTERITIS: Primary | ICD-10-CM

## 2023-07-10 LAB
BASOPHILS # BLD AUTO: 0.02 THOUSANDS/ÂΜL (ref 0–0.1)
BASOPHILS NFR BLD AUTO: 0 % (ref 0–1)
EOSINOPHIL # BLD AUTO: 0.08 THOUSAND/ÂΜL (ref 0–0.61)
EOSINOPHIL NFR BLD AUTO: 1 % (ref 0–6)
ERYTHROCYTE [DISTWIDTH] IN BLOOD BY AUTOMATED COUNT: 13.7 % (ref 11.6–15.1)
HCT VFR BLD AUTO: 43.5 % (ref 36.5–49.3)
HGB BLD-MCNC: 14.3 G/DL (ref 12–17)
IMM GRANULOCYTES # BLD AUTO: 0.02 THOUSAND/UL (ref 0–0.2)
IMM GRANULOCYTES NFR BLD AUTO: 0 % (ref 0–2)
LYMPHOCYTES # BLD AUTO: 1.75 THOUSANDS/ÂΜL (ref 0.6–4.47)
LYMPHOCYTES NFR BLD AUTO: 21 % (ref 14–44)
MCH RBC QN AUTO: 27.2 PG (ref 26.8–34.3)
MCHC RBC AUTO-ENTMCNC: 32.9 G/DL (ref 31.4–37.4)
MCV RBC AUTO: 83 FL (ref 82–98)
MONOCYTES # BLD AUTO: 0.54 THOUSAND/ÂΜL (ref 0.17–1.22)
MONOCYTES NFR BLD AUTO: 7 % (ref 4–12)
NEUTROPHILS # BLD AUTO: 5.78 THOUSANDS/ÂΜL (ref 1.85–7.62)
NEUTS SEG NFR BLD AUTO: 71 % (ref 43–75)
NRBC BLD AUTO-RTO: 0 /100 WBCS
PLATELET # BLD AUTO: 252 THOUSANDS/UL (ref 149–390)
PMV BLD AUTO: 10.3 FL (ref 8.9–12.7)
RBC # BLD AUTO: 5.25 MILLION/UL (ref 3.88–5.62)
WBC # BLD AUTO: 8.19 THOUSAND/UL (ref 4.31–10.16)

## 2023-07-10 PROCEDURE — 36415 COLL VENOUS BLD VENIPUNCTURE: CPT | Performed by: PHYSICIAN ASSISTANT

## 2023-07-10 PROCEDURE — 85025 COMPLETE CBC W/AUTO DIFF WBC: CPT | Performed by: PHYSICIAN ASSISTANT

## 2023-07-10 PROCEDURE — 83690 ASSAY OF LIPASE: CPT | Performed by: PHYSICIAN ASSISTANT

## 2023-07-10 PROCEDURE — 80053 COMPREHEN METABOLIC PANEL: CPT | Performed by: PHYSICIAN ASSISTANT

## 2023-07-10 RX ORDER — MORPHINE SULFATE 4 MG/ML
4 INJECTION, SOLUTION INTRAMUSCULAR; INTRAVENOUS ONCE
Status: COMPLETED | OUTPATIENT
Start: 2023-07-10 | End: 2023-07-10

## 2023-07-10 RX ORDER — ONDANSETRON 2 MG/ML
4 INJECTION INTRAMUSCULAR; INTRAVENOUS ONCE
Status: COMPLETED | OUTPATIENT
Start: 2023-07-10 | End: 2023-07-10

## 2023-07-10 RX ADMIN — SODIUM CHLORIDE 1000 ML: 0.9 INJECTION, SOLUTION INTRAVENOUS at 23:27

## 2023-07-10 RX ADMIN — ONDANSETRON 4 MG: 2 INJECTION INTRAMUSCULAR; INTRAVENOUS at 23:29

## 2023-07-10 RX ADMIN — MORPHINE SULFATE 4 MG: 4 INJECTION, SOLUTION INTRAMUSCULAR; INTRAVENOUS at 23:29

## 2023-07-10 NOTE — Clinical Note
Maria Esther Corrales was seen and treated in our emergency department on 7/10/2023. No restrictions            Diagnosis:     [facility]  may return to work on return date. He may return on this date: 07/14/2023         If you have any questions or concerns, please don't hesitate to call.       Keysha Joiner PA-C    ______________________________           _______________          _______________  Hospital Representative                              Date                                Time

## 2023-07-11 ENCOUNTER — APPOINTMENT (EMERGENCY)
Dept: CT IMAGING | Facility: HOSPITAL | Age: 38
End: 2023-07-11

## 2023-07-11 VITALS
OXYGEN SATURATION: 96 % | HEART RATE: 72 BPM | SYSTOLIC BLOOD PRESSURE: 108 MMHG | DIASTOLIC BLOOD PRESSURE: 62 MMHG | RESPIRATION RATE: 16 BRPM | WEIGHT: 173.9 LBS | TEMPERATURE: 98.5 F | BODY MASS INDEX: 24.95 KG/M2

## 2023-07-11 LAB
ALBUMIN SERPL BCP-MCNC: 4.5 G/DL (ref 3.5–5)
ALP SERPL-CCNC: 66 U/L (ref 34–104)
ALT SERPL W P-5'-P-CCNC: 16 U/L (ref 7–52)
ANION GAP SERPL CALCULATED.3IONS-SCNC: 10 MMOL/L
AST SERPL W P-5'-P-CCNC: 15 U/L (ref 13–39)
BILIRUB SERPL-MCNC: 1 MG/DL (ref 0.2–1)
BILIRUB UR QL STRIP: NEGATIVE
BUN SERPL-MCNC: 11 MG/DL (ref 5–25)
CALCIUM SERPL-MCNC: 9.2 MG/DL (ref 8.4–10.2)
CHLORIDE SERPL-SCNC: 104 MMOL/L (ref 96–108)
CLARITY UR: CLEAR
CO2 SERPL-SCNC: 25 MMOL/L (ref 21–32)
COLOR UR: NORMAL
CREAT SERPL-MCNC: 0.9 MG/DL (ref 0.6–1.3)
GFR SERPL CREATININE-BSD FRML MDRD: 107 ML/MIN/1.73SQ M
GLUCOSE SERPL-MCNC: 138 MG/DL (ref 65–140)
GLUCOSE UR STRIP-MCNC: NEGATIVE MG/DL
HGB UR QL STRIP.AUTO: NEGATIVE
KETONES UR STRIP-MCNC: NEGATIVE MG/DL
LEUKOCYTE ESTERASE UR QL STRIP: NEGATIVE
LIPASE SERPL-CCNC: 39 U/L (ref 11–82)
NITRITE UR QL STRIP: NEGATIVE
PH UR STRIP.AUTO: 6 [PH]
POTASSIUM SERPL-SCNC: 3.7 MMOL/L (ref 3.5–5.3)
PROT SERPL-MCNC: 6.8 G/DL (ref 6.4–8.4)
PROT UR STRIP-MCNC: NEGATIVE MG/DL
SODIUM SERPL-SCNC: 139 MMOL/L (ref 135–147)
SP GR UR STRIP.AUTO: 1.01 (ref 1–1.04)
UROBILINOGEN UA: NEGATIVE MG/DL

## 2023-07-11 PROCEDURE — 74177 CT ABD & PELVIS W/CONTRAST: CPT

## 2023-07-11 PROCEDURE — G1004 CDSM NDSC: HCPCS

## 2023-07-11 RX ORDER — ONDANSETRON 4 MG/1
4 TABLET, ORALLY DISINTEGRATING ORAL EVERY 6 HOURS PRN
Qty: 20 TABLET | Refills: 0 | Status: SHIPPED | OUTPATIENT
Start: 2023-07-11

## 2023-07-11 RX ORDER — TRAMADOL HYDROCHLORIDE 50 MG/1
50 TABLET ORAL EVERY 8 HOURS PRN
Qty: 9 TABLET | Refills: 0 | Status: SHIPPED | OUTPATIENT
Start: 2023-07-11 | End: 2023-07-21

## 2023-07-11 RX ADMIN — IOHEXOL 100 ML: 350 INJECTION, SOLUTION INTRAVENOUS at 01:21

## 2023-07-11 RX ADMIN — MORPHINE SULFATE 2 MG: 2 INJECTION, SOLUTION INTRAMUSCULAR; INTRAVENOUS at 01:35

## 2023-07-11 NOTE — DISCHARGE INSTRUCTIONS
Take medications as directed. Do not drink or drive while taking pain medication. Follow-up with your primary care doctor. Return for new or worsening complaints including worsening pain nausea and vomiting, inability to eat or fever.

## 2023-07-11 NOTE — ED PROVIDER NOTES
History  Chief Complaint   Patient presents with   • Abdominal Pain     Since this morning. States nausea and vomiting. Believes he has another SBO- last was 3 years ago. Last BM yesterday. Took advill at 11am and tramadol at 2pm with no relief. 42-year-old male with history of multiple prior abdominal surgeries as well as small bowel obstruction presents complaining of right lower quadrant abdominal pain that began earlier this morning associated with nausea and vomiting. Patient states that he is concerned he could have another obstruction. Last bowel movement was yesterday. Denies fevers. Has not been able to tolerate oral intake since last night. Denies any other complaints. History provided by:  Patient   used: No        Prior to Admission Medications   Prescriptions Last Dose Informant Patient Reported? Taking? albuterol (2.5 mg/3 mL) 0.083 % nebulizer solution   No No   Sig: Take 1 vial (2.5 mg total) by nebulization every 6 (six) hours as needed for wheezing or shortness of breath   albuterol (PROVENTIL HFA,VENTOLIN HFA) 90 mcg/act inhaler   No No   Sig: Inhale 2 puffs every 4 (four) hours as needed for wheezing   albuterol (PROVENTIL HFA,VENTOLIN HFA) 90 mcg/act inhaler   No No   Sig: Inhale 2 puffs every 4 (four) hours as needed for wheezing or shortness of breath   omeprazole (PriLOSEC) 20 mg delayed release capsule   No No   Sig: Take 1 capsule (20 mg total) by mouth daily   ondansetron (ZOFRAN) 4 mg tablet   No No   Sig: Take 1 tablet (4 mg total) by mouth every 8 (eight) hours as needed for nausea or vomiting      Facility-Administered Medications: None       Past Medical History:   Diagnosis Date   • Asthma    • Small bowel obstruction (HCC)        Past Surgical History:   Procedure Laterality Date   • ABDOMINAL SURGERY      umbilical hernia and right hernia repair; small bowel obstruction   • UMBILICAL HERNIA REPAIR Right 2016       History reviewed.  No pertinent family history. I have reviewed and agree with the history as documented. E-Cigarette/Vaping   • E-Cigarette Use Never User      E-Cigarette/Vaping Substances     Social History     Tobacco Use   • Smoking status: Every Day     Packs/day: 0.25     Types: Cigarettes   • Smokeless tobacco: Never   Vaping Use   • Vaping Use: Never used   Substance Use Topics   • Alcohol use: No   • Drug use: No       Review of Systems   Constitutional: Negative. Negative for chills and fatigue. HENT: Negative for ear pain and sore throat. Eyes: Negative for photophobia and redness. Respiratory: Negative for apnea, cough and shortness of breath. Cardiovascular: Negative for chest pain. Gastrointestinal: Positive for abdominal pain, nausea and vomiting. Genitourinary: Negative for dysuria. Musculoskeletal: Negative for arthralgias, neck pain and neck stiffness. Skin: Negative for rash. Neurological: Negative for dizziness, tremors, syncope and weakness. Psychiatric/Behavioral: Negative for suicidal ideas. Physical Exam  Physical Exam  Constitutional:       General: He is not in acute distress. Appearance: He is well-developed. He is not diaphoretic. Eyes:      Pupils: Pupils are equal, round, and reactive to light. Cardiovascular:      Rate and Rhythm: Normal rate and regular rhythm. Pulmonary:      Effort: Pulmonary effort is normal. No respiratory distress. Breath sounds: Normal breath sounds. Abdominal:      General: Bowel sounds are normal. There is no distension. Palpations: Abdomen is soft. Tenderness: There is abdominal tenderness. There is no right CVA tenderness or left CVA tenderness. Musculoskeletal:         General: Normal range of motion. Cervical back: Normal range of motion and neck supple. Skin:     General: Skin is warm and dry. Neurological:      Mental Status: He is alert and oriented to person, place, and time.          Vital Signs  ED Triage Vitals Temperature Pulse Respirations Blood Pressure SpO2   07/10/23 2251 07/10/23 2251 07/10/23 2251 07/10/23 2251 07/10/23 2251   98.5 °F (36.9 °C) (!) 110 18 133/85 96 %      Temp Source Heart Rate Source Patient Position - Orthostatic VS BP Location FiO2 (%)   07/10/23 2251 07/10/23 2251 07/10/23 2251 07/10/23 2251 --   Tympanic Monitor Sitting Left arm       Pain Score       07/10/23 2327       10 - Worst Possible Pain           Vitals:    07/10/23 2251 07/11/23 0135   BP: 133/85 112/58   Pulse: (!) 110 72   Patient Position - Orthostatic VS: Sitting Lying         Visual Acuity      ED Medications  Medications   morphine injection 4 mg (4 mg Intravenous Given 7/10/23 2329)   ondansetron (ZOFRAN) injection 4 mg (4 mg Intravenous Given 7/10/23 2329)   sodium chloride 0.9 % bolus 1,000 mL (1,000 mL Intravenous New Bag 7/10/23 2327)   morphine injection 2 mg (2 mg Intravenous Given 7/11/23 0135)   iohexol (OMNIPAQUE) 350 MG/ML injection (SINGLE-DOSE) 100 mL (100 mL Intravenous Given 7/11/23 0121)       Diagnostic Studies  Results Reviewed     Procedure Component Value Units Date/Time    UA (URINE) with reflex to Scope [[id number]]  (Normal) Collected: 07/11/23 0108    Lab Status: Final result Specimen: Urine, Clean Catch Updated: 07/11/23 0114     Color, UA Straw     Clarity, UA Clear     Specific Gravity, UA 1.015     pH, UA 6.0     Leukocytes, UA Negative     Nitrite, UA Negative     Protein, UA Negative mg/dl      Glucose, UA Negative mg/dl      Ketones, UA Negative mg/dl      Bilirubin, UA Negative     Occult Blood, UA Negative     UROBILINOGEN UA Negative mg/dL     Comprehensive metabolic panel [[id number]] Collected: 07/10/23 2327    Lab Status: Final result Specimen: Blood from Arm, Left Updated: 07/11/23 0004     Sodium 139 mmol/L      Potassium 3.7 mmol/L      Chloride 104 mmol/L      CO2 25 mmol/L      ANION GAP 10 mmol/L      BUN 11 mg/dL      Creatinine 0.90 mg/dL      Glucose 138 mg/dL      Calcium 9.2 mg/dL AST 15 U/L      ALT 16 U/L      Alkaline Phosphatase 66 U/L      Total Protein 6.8 g/dL      Albumin 4.5 g/dL      Total Bilirubin 1.00 mg/dL      eGFR 107 ml/min/1.73sq m     Narrative:      [facility] guidelines for [facility] Kidney Disease (CKD):   •  Stage 1 with normal or high GFR (GFR > 90 mL/min/1.73 square meters)  •  Stage 2 Mild CKD (GFR = 60-89 mL/min/1.73 square meters)  •  Stage 3A Moderate CKD (GFR = 45-59 mL/min/1.73 square meters)  •  Stage 3B Moderate CKD (GFR = 30-44 mL/min/1.73 square meters)  •  Stage 4 Severe CKD (GFR = 15-29 mL/min/1.73 square meters)  •  Stage 5 End Stage CKD (GFR <15 mL/min/1.73 square meters)  Note: GFR calculation is accurate only with a steady state creatinine    Lipase [[id number]]  (Normal) Collected: 07/10/23 2327    Lab Status: Final result Specimen: Blood from Arm, Left Updated: 07/11/23 0004     Lipase 39 u/L     CBC and differential [[id number]] Collected: 07/10/23 2327    Lab Status: Final result Specimen: Blood from Arm, Left Updated: 07/10/23 2341     WBC 8.19 Thousand/uL      RBC 5.25 Million/uL      Hemoglobin 14.3 g/dL      Hematocrit 43.5 %      MCV 83 fL      MCH 27.2 pg      MCHC 32.9 g/dL      RDW 13.7 %      MPV 10.3 fL      Platelets 879 Thousands/uL      nRBC 0 /100 WBCs      Neutrophils Relative 71 %      Immat GRANS % 0 %      Lymphocytes Relative 21 %      Monocytes Relative 7 %      Eosinophils Relative 1 %      Basophils Relative 0 %      Neutrophils Absolute 5.78 Thousands/µL      Immature Grans Absolute 0.02 Thousand/uL      Lymphocytes Absolute 1.75 Thousands/µL      Monocytes Absolute 0.54 Thousand/µL      Eosinophils Absolute 0.08 Thousand/µL      Basophils Absolute 0.02 Thousands/µL                  CT abdomen pelvis with contrast   Final Result by Haresh Aguilera DO (07/11 0143)      Inflammatory changes about small bowel loops in the right lower quadrant without definite evidence of obstruction.  Correlate for infectious or inflammatory enteritis. The study was marked in [facility] for immediate notification. Workstation performed: [id number]                    Procedures  Procedures         ED Course                                             Medical Decision Making  Patient presented with right lower quadrant abdominal pain. Patient is that high risk for obstruction. CT scan was performed with oral contrast.  Patient was found to have multiple dilated loops of small bowel consistent with enteritis. Patient had significant relief with medications given the emergency department was able to tolerate p.o. intake. Patient was advised that at this time he is still at high risk for small bowel obstruction and needs to return should he develop new or worsening symptoms. Patient demonstrates understanding. Educated on supportive care. Given strict return precautions. Discharged home. Amount and/or Complexity of Data Reviewed  Labs: ordered. Radiology: ordered. Risk  Prescription drug management. Disposition  Final diagnoses:   Enteritis     Time reflects when diagnosis was documented in both MDM as applicable and the Disposition within this note     Time User Action Codes Description Comment    7/11/2023  2:09 AM Shaina Ling [K52.9] Enteritis       ED Disposition     ED Disposition   Discharge    Condition   Stable    Date/Time   Tue Jul 11, 2023  2:09 AM    Comment   Rai Martines discharge to home/self care.                Follow-up Information     Follow up With Specialties Details Why Contact Info Additional [id number] [facility] Emergency Department Emergency Medicine Go to  If symptoms worsen [phone] [phone] [id number] [facility] Emergency Department          Patient's Medications   Discharge Prescriptions    ONDANSETRON (ZOFRAN ODT) 4 MG DISINTEGRATING TABLET    Take 1 tablet (4 mg total) by mouth every 6 (six) hours as needed for nausea or vomiting       Start Date: 7/11/2023 End Date: --       Order Dose: 4 mg       Quantity: 20 tablet    Refills: 0    TRAMADOL (ULTRAM) 50 MG TABLET    Take 1 tablet (50 mg total) by mouth every 8 (eight) hours as needed for moderate pain for up to 10 days       Start Date: 7/11/2023 End Date: 7/21/2023       Order Dose: 50 mg       Quantity: 9 tablet    Refills: 0       No discharge procedures on file.     PDMP Review     None          ED Provider  Electronically Signed by           Nereyda Cárdenas PA-C  07/11/23 0234

## 2024-04-23 ENCOUNTER — HOSPITAL ENCOUNTER (EMERGENCY)
Facility: HOSPITAL | Age: 39
Discharge: HOME | End: 2024-04-23
Attending: EMERGENCY MEDICINE
Payer: COMMERCIAL

## 2024-04-23 VITALS
RESPIRATION RATE: 16 BRPM | OXYGEN SATURATION: 100 % | DIASTOLIC BLOOD PRESSURE: 88 MMHG | BODY MASS INDEX: 24.8 KG/M2 | WEIGHT: 172.84 LBS | HEART RATE: 92 BPM | SYSTOLIC BLOOD PRESSURE: 121 MMHG | TEMPERATURE: 98.2 F

## 2024-04-23 DIAGNOSIS — T14.8XXA MUSCLE STRAIN: ICD-10-CM

## 2024-04-23 DIAGNOSIS — R10.32 LEFT GROIN PAIN: Primary | ICD-10-CM

## 2024-04-23 PROCEDURE — 96372 THER/PROPH/DIAG INJ SC/IM: CPT

## 2024-04-23 PROCEDURE — 99282 EMERGENCY DEPT VISIT SF MDM: CPT

## 2024-04-23 PROCEDURE — 99284 EMERGENCY DEPT VISIT MOD MDM: CPT | Performed by: EMERGENCY MEDICINE

## 2024-04-23 RX ORDER — ALBUTEROL SULFATE 90 UG/1
2 AEROSOL, METERED RESPIRATORY (INHALATION) ONCE
Status: COMPLETED | OUTPATIENT
Start: 2024-04-23 | End: 2024-04-23

## 2024-04-23 RX ORDER — SENNOSIDES 8.6 MG
650 CAPSULE ORAL EVERY 8 HOURS PRN
Qty: 30 TABLET | Refills: 0 | Status: SHIPPED | OUTPATIENT
Start: 2024-04-23

## 2024-04-23 RX ORDER — LIDOCAINE 50 MG/G
1 PATCH TOPICAL DAILY
Qty: 10 PATCH | Refills: 0 | Status: SHIPPED | OUTPATIENT
Start: 2024-04-23 | End: 2024-05-03

## 2024-04-23 RX ORDER — LIDOCAINE 50 MG/G
1 PATCH TOPICAL ONCE
Status: DISCONTINUED | OUTPATIENT
Start: 2024-04-23 | End: 2024-04-23 | Stop reason: HOSPADM

## 2024-04-23 RX ORDER — KETOROLAC TROMETHAMINE 30 MG/ML
15 INJECTION, SOLUTION INTRAMUSCULAR; INTRAVENOUS ONCE
Status: COMPLETED | OUTPATIENT
Start: 2024-04-23 | End: 2024-04-23

## 2024-04-23 RX ORDER — ACETAMINOPHEN 325 MG/1
650 TABLET ORAL ONCE
Status: COMPLETED | OUTPATIENT
Start: 2024-04-23 | End: 2024-04-23

## 2024-04-23 RX ORDER — IBUPROFEN 600 MG/1
600 TABLET ORAL EVERY 6 HOURS PRN
Qty: 30 TABLET | Refills: 0 | Status: SHIPPED | OUTPATIENT
Start: 2024-04-23 | End: 2024-05-03

## 2024-04-23 RX ADMIN — ACETAMINOPHEN 650 MG: 325 TABLET, FILM COATED ORAL at 04:58

## 2024-04-23 RX ADMIN — LIDOCAINE 5% 1 PATCH: 700 PATCH TOPICAL at 05:01

## 2024-04-23 RX ADMIN — KETOROLAC TROMETHAMINE 15 MG: 30 INJECTION, SOLUTION INTRAMUSCULAR; INTRAVENOUS at 04:59

## 2024-04-23 RX ADMIN — ALBUTEROL SULFATE 2 PUFF: 90 AEROSOL, METERED RESPIRATORY (INHALATION) at 04:58

## 2024-04-23 NOTE — Clinical Note
Maged Farzana was seen and treated in our emergency department on 4/23/2024.            No heavy lifting for 3-5 days    Diagnosis: L hip flexor strain    Maged  .    He may return on this date: 04/25/2024         If you have any questions or concerns, please don't hesitate to call.      Rachael Marin MD    ______________________________           _______________          _______________  Hospital Representative                              Date                                Time

## 2024-04-23 NOTE — DISCHARGE INSTRUCTIONS
You were seen today for left groin pain. This is consistent with muscle strain. Please engage in activity as tolerated as movement will help you heal. Do avoid heavy lifting for the next 3-5 days. You can use tylenol, motrin, Lidoderm patches, and topical Voltaren gel as prescribed.    You have been given a referral to physical therapy should you have difficulty healing

## 2024-04-23 NOTE — ED PROVIDER NOTES
History  Chief Complaint   Patient presents with    Groin Pain     Pt reports left-sided groin pain x 2 days. Today woke up with severe sharp pain and difficulty ambulating. Denies urinary symptoms. Denies known injury. Reports constant moving and lifting at work.     HPI    Maged Yanes is a 38 y.o. male presenting with CC of left groin pain. 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 He first noticed this pain two days ago but states it was worse this morning - he had difficulty getting out or bed due to the pain and noticed it was worse with hip flexion. Patient presented to the ER [facility] because he had difficulty sitting down on the toilet due to the pain. He denies any changes in appearance of inguinal fold/penis/testes. He has not taken anything for pain. He feels the only thing that has helped his pain so far is applying pressure/squeezing his anterior thigh with his hand.     Prior to Admission Medications   Prescriptions Last Dose Informant Patient Reported? Taking?   albuterol (2.5 mg/3 mL) 0.083 % nebulizer solution   No No   Sig: Take 1 vial (2.5 mg total) by nebulization every 6 (six) hours as needed for wheezing or shortness of breath   albuterol (PROVENTIL HFA,VENTOLIN HFA) 90 mcg/act inhaler   No No   Sig: Inhale 2 puffs every 4 (four) hours as needed for wheezing   albuterol (PROVENTIL HFA,VENTOLIN HFA) 90 mcg/act inhaler   No No   Sig: Inhale 2 puffs every 4 (four) hours as needed for wheezing or shortness of breath   omeprazole (PriLOSEC) 20 mg delayed release capsule   No No   Sig: Take 1 capsule (20 mg total) by mouth daily   ondansetron (ZOFRAN) 4 mg tablet   No No   Sig: Take 1 tablet (4 mg total) by mouth every 8 (eight) hours as needed for nausea or vomiting   ondansetron (Zofran ODT) 4 mg disintegrating tablet   No No   Sig: Take 1 tablet (4 mg total) by mouth every 6 (six) hours as needed for nausea or vomiting      Facility-Administered  Medications: None       Past Medical History:   Diagnosis Date    Asthma     Small bowel obstruction (HCC)        Past Surgical History:   Procedure Laterality Date    ABDOMINAL SURGERY      umbilical hernia and right hernia repair; small bowel obstruction    UMBILICAL HERNIA REPAIR Right 2016       History reviewed. No pertinent family history.  I have reviewed and agree with the history as documented.    E-Cigarette/Vaping    E-Cigarette Use Never User      E-Cigarette/Vaping Substances    Nicotine No     THC No     CBD No     Flavoring No     Other No     Unknown No      Social History     Tobacco Use    Smoking status: Every Day     Current packs/day: 0.25     Types: Cigarettes    Smokeless tobacco: Never   Vaping Use    Vaping status: Never Used   Substance Use Topics    Alcohol use: No    Drug use: No        Review of Systems   Constitutional:  Negative for activity change, appetite change, chills, fatigue and fever.   Gastrointestinal:  Negative for abdominal distention, constipation, diarrhea, nausea and vomiting.   Genitourinary:  Negative for difficulty urinating, penile discharge, penile pain, penile swelling, scrotal swelling and testicular pain.   Skin:  Negative for color change and wound.       Physical Exam  ED Triage Vitals   Temperature Pulse Respirations Blood Pressure SpO2   04/23/24 0431 04/23/24 0429 04/23/24 0429 04/23/24 0429 04/23/24 0429   98.2 °F (36.8 °C) 92 16 121/88 100 %      Temp Source Heart Rate Source Patient Position - Orthostatic VS BP Location FiO2 (%)   04/23/24 0431 04/23/24 0429 04/23/24 0429 04/23/24 0429 --   Oral Monitor Sitting Right arm       Pain Score       04/23/24 0429       4             Orthostatic Vital Signs  Vitals:    04/23/24 0429   BP: 121/88   Pulse: 92   Patient Position - Orthostatic VS: Sitting       Physical Exam  Constitutional:       Appearance: Normal appearance.   HENT:      Head: Normocephalic and atraumatic.      Nose: Nose normal.   Eyes:       Pupils: Pupils are equal, round, and reactive to light.   Cardiovascular:      Rate and Rhythm: Normal rate.   Pulmonary:      Effort: Pulmonary effort is normal.   Abdominal:      General: Abdomen is flat.      Palpations: Abdomen is soft.   Genitourinary:     Penis: Normal.       Testes: Normal.      Comments: No inguinal or femoral hernia noted when patient standing for turn and cough.   Musculoskeletal:         General: No swelling.      Right lower leg: No edema.      Left lower leg: No edema.      Comments: Patient with antalgic gait; difficulty changing positions in bed or going from sitting to standing. There is reproducible tenderness along anterior proximal thigh and proximal inguinal fold. There is no palpable mass or deformity.    Skin:     General: Skin is warm and dry.      Capillary Refill: Capillary refill takes less than 2 seconds.   Neurological:      General: No focal deficit present.      Mental Status: He is alert and oriented to person, place, and time.         ED Medications  Medications   albuterol (PROVENTIL HFA,VENTOLIN HFA) inhaler 2 puff (has no administration in time range)   acetaminophen (TYLENOL) tablet 650 mg (has no administration in time range)   ketorolac (TORADOL) injection 15 mg (has no administration in time range)   lidocaine (LIDODERM) 5 % patch 1 patch (has no administration in time range)       Diagnostic Studies  Results Reviewed       None                   No orders to display         Procedures  Procedures      ED Course                                       Medical Decision Making    Patient is a 38 y.o. male  who presents to the ED with L groin pain.    Vital signs stable, unremarkable. Exam as listed above    Differential diagnosis includes but is not limited to -  Hx concerning for inguinal or femoral hernia, however no hernia noted on exam. Exam more consistent with hip flexor strain. There is no ecchymosis or swelling to indicate muscle tear. Will treat as MSK.      Plan tylenol, toradol, lidoderm patch, PT referral, work note.     View ED course above for further discussion on patient workup.     All labs reviewed and utilized in the medical decision making process  All radiology studies independently viewed by me and interpreted by the radiologist.  I reviewed all testing with the patient.         Disposition  Final diagnoses:   Left groin pain   Muscle strain     Time reflects when diagnosis was documented in both MDM as applicable and the Disposition within this note       Time User Action Codes Description Comment    4/23/2024  4:56 AM Rachael Marin [R10.32] Left groin pain     4/23/2024  4:56 AM Rachael Marni [T14.8XXA] Muscle strain           ED Disposition       ED Disposition   Discharge    Condition   Stable    Date/Time   Tue Apr 23, 2024  4:55 AM    Comment   Maged Yanes discharge to home/self care.                   Follow-up Information    None         Patient's Medications   Discharge Prescriptions    No medications on file     No discharge procedures on file.    PDMP Review       None             ED Provider  Attending physically available and evaluated Maged Jacksonrobert. I managed the patient along with the ED Attending.    Electronically Signed by           Rachael Marin MD  04/23/24 1900

## 2024-04-23 NOTE — ED ATTENDING ATTESTATION
4/23/2024  I, Remi Oliva MD, saw and evaluated the patient. I have discussed the patient with the resident/non-physician practitioner and agree with the resident's/non-physician practitioner's findings, Plan of Care, and MDM as documented in the resident's/non-physician practitioner's note, except where noted. All available labs and Radiology studies were reviewed.  I was present for key portions of any procedure(s) performed by the resident/non-physician practitioner and I was immediately available to provide assistance.       At this point I agree with the current assessment done in the Emergency Department.  I have conducted an independent evaluation of this patient a history and physical is as follows:        Final Diagnosis:  1. Left groin pain    2. Muscle strain      Chief Complaint   Patient presents with    Groin Pain     Pt reports left-sided groin pain x 2 days. Today woke up with severe sharp pain and difficulty ambulating. Denies urinary symptoms. Denies known injury. Reports constant moving and lifting at work.         38-year-old male who presents with left groin pain.  Has been ongoing for the past 2 days.  Worsening this morning.  Pain made worse with movement.  No inciting event or injury.  No GI or  complaints.  Has not taken anything for pain.  No radiation of pain or radiculopathy symptoms.    PMH:  Past Medical History:   Diagnosis Date    Asthma     Small bowel obstruction (HCC)        PSH:  Past Surgical History:   Procedure Laterality Date    ABDOMINAL SURGERY      umbilical hernia and right hernia repair; small bowel obstruction    UMBILICAL HERNIA REPAIR Right 2016         PE:   Vitals:    04/23/24 0429 04/23/24 0431   BP: 121/88    BP Location: Right arm    Pulse: 92    Resp: 16    Temp:  98.2 °F (36.8 °C)   TempSrc:  Oral   SpO2: 100%    Weight: 78.4 kg (172 lb 13.5 oz)          Constitutional: Vital signs are normal. He appears well-developed. He is cooperative. No distress.    HENT:   Mouth/Throat: Uvula is midline, oropharynx is clear and moist and mucous membranes are normal.   Eyes: Pupils are equal, round, and reactive to light. Conjunctivae and EOM are normal.   Neck: Trachea normal. No thyroid mass and no thyromegaly present.   Cardiovascular: Normal rate, regular rhythm, normal heart sounds.   No murmur heard.  Abdominal: Soft.  Large midline abdominal surgical scar from remote surgery.  Bowel sounds are normal. There is no tenderness. There is no rebound, no guarding.   : No rashes, lumps/bumps.  Testicles normal.  No inguinal hernia.  Point tenderness proximal, anterior, medial thigh.  No masses, infection, evidence of trauma.  MSK: No tenderness over left hip joint.  No effusion, deformity, evidence of trauma.  Pain with EDI/FADIR.  Neurological: He is alert.  Skin: Skin is warm, dry and intact.   Psychiatric: He has a normal mood and affect. His speech is normal and behavior is normal. Thought content normal.      A:  -38-year-old male presents with left inguinal pain.    P:  -Symptoms and exam consistent with a hip flexor tendinitis.  Patient has no risk factors for iliopsoas abscess.  No abdominal tenderness to indicate intra-abdominal pathology.  Testicles and hernia exam unremarkable.  Treat conservatively for now.  PT referral.  Strict return precautions given.    - 13 point ROS was performed and all are normal unless stated in the history above.   - Nursing note reviewed. Vitals reviewed.   - Orders placed by myself and/or advanced practitioner / resident.    - Previous chart was reviewed  - No language barrier.   - History obtained from patient.   - There are no limitations to the history obtained.   - Critical care time: Not applicable for this patient.          Medications   lidocaine (LIDODERM) 5 % patch 1 patch (1 patch Topical Medication Applied 4/23/24 4172)   albuterol (PROVENTIL HFA,VENTOLIN HFA) inhaler 2 puff (2 puffs Inhalation Given 4/23/24 1957)    acetaminophen (TYLENOL) tablet 650 mg (650 mg Oral Given 4/23/24 0458)   ketorolac (TORADOL) injection 15 mg (15 mg Intramuscular Given 4/23/24 0459)     No orders to display     Orders Placed This Encounter   Procedures    Ambulatory Referral to Physical Therapy     Labs Reviewed - No data to display  Time reflects when diagnosis was documented in both MDM as applicable and the Disposition within this note       Time User Action Codes Description Comment    4/23/2024  4:56 AM Rahcael Marin [R10.32] Left groin pain     4/23/2024  4:56 AM Rachael Marin [T14.8XXA] Muscle strain           ED Disposition       ED Disposition   Discharge    Condition   Stable    Date/Time   Tue Apr 23, 2024  4:55 AM    Comment   Maged Yanes discharge to home/self care.                   Follow-up Information    None       Patient's Medications   Discharge Prescriptions    ACETAMINOPHEN (TYLENOL) 650 MG CR TABLET    Take 1 tablet (650 mg total) by mouth every 8 (eight) hours as needed for mild pain       Start Date: 4/23/2024 End Date: --       Order Dose: 650 mg       Quantity: 30 tablet    Refills: 0    DICLOFENAC SODIUM (VOLTAREN) 1 %    Apply 2 g topically 4 (four) times a day for 10 days       Start Date: 4/23/2024 End Date: 5/3/2024       Order Dose: 2 g       Quantity: 80 g    Refills: 0    IBUPROFEN (MOTRIN) 600 MG TABLET    Take 1 tablet (600 mg total) by mouth every 6 (six) hours as needed for mild pain for up to 10 days       Start Date: 4/23/2024 End Date: 5/3/2024       Order Dose: 600 mg       Quantity: 30 tablet    Refills: 0    LIDOCAINE (LIDODERM) 5 %    Apply 1 patch topically over 12 hours daily for 10 days Remove & Discard patch within 12 hours or as directed by MD       Start Date: 4/23/2024 End Date: 5/3/2024       Order Dose: 1 patch       Quantity: 10 patch    Refills: 0       Prior to Admission Medications   Prescriptions Last Dose Informant Patient Reported? Taking?   albuterol (2.5 mg/3 mL) 0.083  "% nebulizer solution   No No   Sig: Take 1 vial (2.5 mg total) by nebulization every 6 (six) hours as needed for wheezing or shortness of breath   albuterol (PROVENTIL HFA,VENTOLIN HFA) 90 mcg/act inhaler   No No   Sig: Inhale 2 puffs every 4 (four) hours as needed for wheezing   albuterol (PROVENTIL HFA,VENTOLIN HFA) 90 mcg/act inhaler   No No   Sig: Inhale 2 puffs every 4 (four) hours as needed for wheezing or shortness of breath   omeprazole (PriLOSEC) 20 mg delayed release capsule   No No   Sig: Take 1 capsule (20 mg total) by mouth daily   ondansetron (ZOFRAN) 4 mg tablet   No No   Sig: Take 1 tablet (4 mg total) by mouth every 8 (eight) hours as needed for nausea or vomiting   ondansetron (Zofran ODT) 4 mg disintegrating tablet   No No   Sig: Take 1 tablet (4 mg total) by mouth every 6 (six) hours as needed for nausea or vomiting      Facility-Administered Medications: None       Portions of the record may have been created with voice recognition software. Occasional wrong word or \"sound a like\" substitutions may have occurred due to the inherent limitations of voice recognition software. Read the chart carefully and recognize, using context, where substitutions have occurred.      ED Course         Critical Care Time  Procedures      "

## 2024-11-15 ENCOUNTER — HOSPITAL ENCOUNTER (EMERGENCY)
Facility: HOSPITAL | Age: 39
Discharge: HOME | End: 2024-11-15
Attending: EMERGENCY MEDICINE
Payer: COMMERCIAL

## 2024-11-15 ENCOUNTER — APPOINTMENT (EMERGENCY)
Dept: CT IMAGING | Facility: HOSPITAL | Age: 39
End: 2024-11-15
Payer: COMMERCIAL

## 2024-11-15 VITALS
HEART RATE: 96 BPM | OXYGEN SATURATION: 98 % | TEMPERATURE: 98.4 F | BODY MASS INDEX: 25.27 KG/M2 | DIASTOLIC BLOOD PRESSURE: 100 MMHG | SYSTOLIC BLOOD PRESSURE: 187 MMHG | RESPIRATION RATE: 20 BRPM | WEIGHT: 176.15 LBS

## 2024-11-15 DIAGNOSIS — K52.9 ENTERITIS: Primary | ICD-10-CM

## 2024-11-15 LAB
ALBUMIN SERPL BCG-MCNC: 4.6 G/DL (ref 3.5–5)
ALP SERPL-CCNC: 66 U/L (ref 34–104)
ALT SERPL W P-5'-P-CCNC: 24 U/L (ref 7–52)
ANION GAP SERPL CALCULATED.3IONS-SCNC: 8 MMOL/L (ref 4–13)
AST SERPL W P-5'-P-CCNC: 16 U/L (ref 13–39)
BACTERIA UR QL AUTO: ABNORMAL /HPF
BASOPHILS # BLD AUTO: 0.04 THOUSANDS/ÂΜL (ref 0–0.1)
BASOPHILS NFR BLD AUTO: 0 % (ref 0–1)
BILIRUB SERPL-MCNC: 0.46 MG/DL (ref 0.2–1)
BILIRUB UR QL STRIP: NEGATIVE
BUN SERPL-MCNC: 10 MG/DL (ref 5–25)
CALCIUM SERPL-MCNC: 9.2 MG/DL (ref 8.4–10.2)
CHLORIDE SERPL-SCNC: 104 MMOL/L (ref 96–108)
CLARITY UR: CLEAR
CO2 SERPL-SCNC: 30 MMOL/L (ref 21–32)
COLOR UR: ABNORMAL
CREAT SERPL-MCNC: 0.84 MG/DL (ref 0.6–1.3)
EOSINOPHIL # BLD AUTO: 0.09 THOUSAND/ÂΜL (ref 0–0.61)
EOSINOPHIL NFR BLD AUTO: 1 % (ref 0–6)
ERYTHROCYTE [DISTWIDTH] IN BLOOD BY AUTOMATED COUNT: 12.9 % (ref 11.6–15.1)
GFR SERPL CREATININE-BSD FRML MDRD: 109 ML/MIN/1.73SQ M
GLUCOSE SERPL-MCNC: 90 MG/DL (ref 65–140)
GLUCOSE UR STRIP-MCNC: NEGATIVE MG/DL
HCT VFR BLD AUTO: 40.7 % (ref 36.5–49.3)
HGB BLD-MCNC: 13.2 G/DL (ref 12–17)
HGB UR QL STRIP.AUTO: NEGATIVE
IMM GRANULOCYTES # BLD AUTO: 0.02 THOUSAND/UL (ref 0–0.2)
IMM GRANULOCYTES NFR BLD AUTO: 0 % (ref 0–2)
KETONES UR STRIP-MCNC: NEGATIVE MG/DL
LEUKOCYTE ESTERASE UR QL STRIP: 25
LIPASE SERPL-CCNC: 48 U/L (ref 11–82)
LYMPHOCYTES # BLD AUTO: 1.77 THOUSANDS/ÂΜL (ref 0.6–4.47)
LYMPHOCYTES NFR BLD AUTO: 18 % (ref 14–44)
MCH RBC QN AUTO: 27.6 PG (ref 26.8–34.3)
MCHC RBC AUTO-ENTMCNC: 32.4 G/DL (ref 31.4–37.4)
MCV RBC AUTO: 85 FL (ref 82–98)
MONOCYTES # BLD AUTO: 0.57 THOUSAND/ÂΜL (ref 0.17–1.22)
MONOCYTES NFR BLD AUTO: 6 % (ref 4–12)
MUCOUS THREADS UR QL AUTO: ABNORMAL
NEUTROPHILS # BLD AUTO: 7.55 THOUSANDS/ÂΜL (ref 1.85–7.62)
NEUTS SEG NFR BLD AUTO: 75 % (ref 43–75)
NITRITE UR QL STRIP: NEGATIVE
NON-SQ EPI CELLS URNS QL MICRO: ABNORMAL /HPF
NRBC BLD AUTO-RTO: 0 /100 WBCS
PH UR STRIP.AUTO: 5 [PH]
PLATELET # BLD AUTO: 250 THOUSANDS/UL (ref 149–390)
PMV BLD AUTO: 10.8 FL (ref 8.9–12.7)
POTASSIUM SERPL-SCNC: 3.9 MMOL/L (ref 3.5–5.3)
PROT SERPL-MCNC: 7 G/DL (ref 6.4–8.4)
PROT UR STRIP-MCNC: ABNORMAL MG/DL
RBC # BLD AUTO: 4.79 MILLION/UL (ref 3.88–5.62)
RBC #/AREA URNS AUTO: ABNORMAL /HPF
SODIUM SERPL-SCNC: 142 MMOL/L (ref 135–147)
SP GR UR STRIP.AUTO: 1 (ref 1–1.04)
UROBILINOGEN UA: NEGATIVE MG/DL
WBC # BLD AUTO: 10.04 THOUSAND/UL (ref 4.31–10.16)
WBC #/AREA URNS AUTO: ABNORMAL /HPF

## 2024-11-15 PROCEDURE — 85025 COMPLETE CBC W/AUTO DIFF WBC: CPT | Performed by: EMERGENCY MEDICINE

## 2024-11-15 PROCEDURE — 80053 COMPREHEN METABOLIC PANEL: CPT | Performed by: EMERGENCY MEDICINE

## 2024-11-15 PROCEDURE — 99284 EMERGENCY DEPT VISIT MOD MDM: CPT

## 2024-11-15 PROCEDURE — 96375 TX/PRO/DX INJ NEW DRUG ADDON: CPT

## 2024-11-15 PROCEDURE — 81001 URINALYSIS AUTO W/SCOPE: CPT | Performed by: EMERGENCY MEDICINE

## 2024-11-15 PROCEDURE — 81003 URINALYSIS AUTO W/O SCOPE: CPT | Performed by: EMERGENCY MEDICINE

## 2024-11-15 PROCEDURE — 36415 COLL VENOUS BLD VENIPUNCTURE: CPT | Performed by: EMERGENCY MEDICINE

## 2024-11-15 PROCEDURE — 96374 THER/PROPH/DIAG INJ IV PUSH: CPT

## 2024-11-15 PROCEDURE — 74177 CT ABD & PELVIS W/CONTRAST: CPT

## 2024-11-15 PROCEDURE — 99285 EMERGENCY DEPT VISIT HI MDM: CPT | Performed by: EMERGENCY MEDICINE

## 2024-11-15 PROCEDURE — 96361 HYDRATE IV INFUSION ADD-ON: CPT

## 2024-11-15 PROCEDURE — 83690 ASSAY OF LIPASE: CPT | Performed by: EMERGENCY MEDICINE

## 2024-11-15 RX ORDER — ONDANSETRON 2 MG/ML
4 INJECTION INTRAMUSCULAR; INTRAVENOUS ONCE
Status: COMPLETED | OUTPATIENT
Start: 2024-11-15 | End: 2024-11-15

## 2024-11-15 RX ORDER — IPRATROPIUM BROMIDE AND ALBUTEROL SULFATE 2.5; .5 MG/3ML; MG/3ML
3 SOLUTION RESPIRATORY (INHALATION) ONCE
Status: DISCONTINUED | OUTPATIENT
Start: 2024-11-15 | End: 2024-11-15

## 2024-11-15 RX ORDER — MORPHINE SULFATE 4 MG/ML
4 INJECTION, SOLUTION INTRAMUSCULAR; INTRAVENOUS ONCE
Status: COMPLETED | OUTPATIENT
Start: 2024-11-15 | End: 2024-11-15

## 2024-11-15 RX ORDER — PREDNISOLONE SODIUM PHOSPHATE 15 MG/5ML
1 SOLUTION ORAL DAILY
Qty: 106.4 ML | Refills: 0 | Status: SHIPPED | OUTPATIENT
Start: 2024-11-15 | End: 2024-11-15

## 2024-11-15 RX ORDER — KETOROLAC TROMETHAMINE 30 MG/ML
15 INJECTION, SOLUTION INTRAMUSCULAR; INTRAVENOUS ONCE
Status: COMPLETED | OUTPATIENT
Start: 2024-11-15 | End: 2024-11-15

## 2024-11-15 RX ORDER — ALBUTEROL SULFATE 90 UG/1
2 INHALANT RESPIRATORY (INHALATION) ONCE
Status: COMPLETED | OUTPATIENT
Start: 2024-11-15 | End: 2024-11-15

## 2024-11-15 RX ADMIN — KETOROLAC TROMETHAMINE 15 MG: 30 INJECTION, SOLUTION INTRAMUSCULAR; INTRAVENOUS at 16:24

## 2024-11-15 RX ADMIN — SODIUM CHLORIDE 1000 ML: 0.9 INJECTION, SOLUTION INTRAVENOUS at 13:37

## 2024-11-15 RX ADMIN — ONDANSETRON 4 MG: 2 INJECTION INTRAMUSCULAR; INTRAVENOUS at 13:34

## 2024-11-15 RX ADMIN — ALBUTEROL SULFATE 2 PUFF: 90 AEROSOL, METERED RESPIRATORY (INHALATION) at 16:24

## 2024-11-15 RX ADMIN — IOHEXOL 90 ML: 350 INJECTION, SOLUTION INTRAVENOUS at 14:35

## 2024-11-15 RX ADMIN — MORPHINE SULFATE 4 MG: 4 INJECTION, SOLUTION INTRAMUSCULAR; INTRAVENOUS at 13:36

## 2024-11-15 RX ADMIN — MORPHINE SULFATE 4 MG: 4 INJECTION, SOLUTION INTRAMUSCULAR; INTRAVENOUS at 14:05

## 2024-11-15 NOTE — ED CARE HANDOFF
Emergency Department Sign Out Note        Sign out and transfer of care from Dr. Capps. See Separate Emergency Department note.     The patient, Maged Yanes, was evaluated by the previous provider for Abdominal pain .    Workup Completed:  Abdominal labs and CT    ED Course / Workup Pending (followup):  Patient with enteritis and possible IBD. Patient dishcarged with instruction to follow up with GI                                      Procedures  Medical Decision Making          Disposition  Final diagnoses:   Enteritis     Time reflects when diagnosis was documented in both MDM as applicable and the Disposition within this note       Time User Action Codes Description Comment    11/15/2024  2:15 PM Sagar Capps Add [J45.909] Asthma     11/15/2024  2:26 PM Sagar Capps Remove [J45.909] Asthma     11/15/2024  3:47 PM Estevan Terry Add [K52.9] Enteritis           ED Disposition       ED Disposition   Discharge    Condition   Stable    Date/Time   Fri Nov 15, 2024  3:47 PM    Comment   Maged Yanes discharge to home/self care.                   Follow-up Information       Follow up With Specialties Details Why Contact Info Additional Information    [facility] Emergency Department Emergency Medicine Go to   [address] [address] [phone] [facility] Emergency Department    [facility] Schedule an appointment as soon as possible for a visit   [address]  [facility] [address]  [facility] [address] [phone] [facility] [place], [facility] [address] [facility] [address], [place], [place], [address] [phone]          Current Discharge Medication List        CONTINUE these medications which have NOT CHANGED    Details   acetaminophen (TYLENOL) 650 mg CR tablet Take 1 tablet (650 mg total) by mouth every 8 (eight) hours as needed for mild pain  Qty: 30 tablet,  Refills: 0    Associated Diagnoses: Left groin pain; Muscle strain      albuterol (2.5 mg/3 mL) 0.083 % nebulizer solution Take 1 vial (2.5 mg total) by nebulization every 6 (six) hours as needed for wheezing or shortness of breath  Qty: 75 mL, Refills: 0    Associated Diagnoses: Asthma exacerbation      !! albuterol (PROVENTIL HFA,VENTOLIN HFA) 90 mcg/act inhaler Inhale 2 puffs every 4 (four) hours as needed for wheezing  Qty: 1 Inhaler, Refills: 0    Associated Diagnoses: Abdominal pain      !! albuterol (PROVENTIL HFA,VENTOLIN HFA) 90 mcg/act inhaler Inhale 2 puffs every 4 (four) hours as needed for wheezing or shortness of breath  Qty: 1 Inhaler, Refills: 0    Comments: Substitution to a formulary equivalent within the same pharmaceutical class is authorized.  Associated Diagnoses: Constipation, unspecified constipation type      Diclofenac Sodium (VOLTAREN) 1 % Apply 2 g topically 4 (four) times a day for 10 days  Qty: 80 g, Refills: 0    Associated Diagnoses: Left groin pain; Muscle strain      ibuprofen (MOTRIN) 600 mg tablet Take 1 tablet (600 mg total) by mouth every 6 (six) hours as needed for mild pain for up to 10 days  Qty: 30 tablet, Refills: 0    Associated Diagnoses: Left groin pain; Muscle strain      lidocaine (Lidoderm) 5 % Apply 1 patch topically over 12 hours daily for 10 days Remove & Discard patch within 12 hours or as directed by MD  Qty: 10 patch, Refills: 0    Associated Diagnoses: Left groin pain; Muscle strain      omeprazole (PriLOSEC) 20 mg delayed release capsule Take 1 capsule (20 mg total) by mouth daily  Qty: 20 capsule, Refills: 0    Associated Diagnoses: Constipation, unspecified constipation type      ondansetron (Zofran ODT) 4 mg disintegrating tablet Take 1 tablet (4 mg total) by mouth every 6 (six) hours as needed for nausea or vomiting  Qty: 20 tablet, Refills: 0    Associated Diagnoses: Enteritis      ondansetron (ZOFRAN) 4 mg tablet Take 1 tablet (4 mg total) by mouth every  8 (eight) hours as needed for nausea or vomiting  Qty: 12 tablet, Refills: 0    Associated Diagnoses: Nausea and vomiting, intractability of vomiting not specified, unspecified vomiting type       !! - Potential duplicate medications found. Please discuss with provider.        No discharge procedures on file.       ED Provider  Electronically Signed by     Estevan Terry MD  11/15/24 2273

## 2024-11-15 NOTE — Clinical Note
Maged Yanes was seen and treated in our emergency department on 11/15/2024.                Diagnosis:     Maged  may return to work on return date.    He may return on this date: 11/18/2024         If you have any questions or concerns, please don't hesitate to call.      Estevan Terry MD    ______________________________           _______________          _______________  Hospital Representative                              Date                                Time

## 2024-11-15 NOTE — ED NOTES
Patient sleeping comfortably in stretcher with no signs of distress at this time.  Respirations are equal and non-labored.      Klaudia Morales RN  11/15/24 2104
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
## 2024-12-04 ENCOUNTER — CONSULT (OUTPATIENT)
Dept: GASTROENTEROLOGY | Facility: MEDICAL CENTER | Age: 39
End: 2024-12-04
Payer: MEDICARE

## 2024-12-04 ENCOUNTER — TELEPHONE (OUTPATIENT)
Dept: GASTROENTEROLOGY | Facility: MEDICAL CENTER | Age: 39
End: 2024-12-04

## 2024-12-04 VITALS
SYSTOLIC BLOOD PRESSURE: 123 MMHG | HEART RATE: 101 BPM | WEIGHT: 176 LBS | OXYGEN SATURATION: 98 % | TEMPERATURE: 97.8 F | DIASTOLIC BLOOD PRESSURE: 84 MMHG | BODY MASS INDEX: 25.25 KG/M2

## 2024-12-04 DIAGNOSIS — Z98.890 H/O EXPLORATORY LAPAROTOMY: ICD-10-CM

## 2024-12-04 DIAGNOSIS — S31.139S GUNSHOT WOUND OF ABDOMEN, SEQUELA: ICD-10-CM

## 2024-12-04 DIAGNOSIS — R19.4 RECENT CHANGE IN FREQUENCY OF BOWEL MOVEMENTS: Primary | ICD-10-CM

## 2024-12-04 DIAGNOSIS — R93.5 ABNORMAL CT OF THE ABDOMEN: ICD-10-CM

## 2024-12-04 PROCEDURE — 99204 OFFICE O/P NEW MOD 45 MIN: CPT | Performed by: INTERNAL MEDICINE

## 2024-12-04 RX ORDER — SODIUM CHLORIDE, SODIUM LACTATE, POTASSIUM CHLORIDE, CALCIUM CHLORIDE 600; 310; 30; 20 MG/100ML; MG/100ML; MG/100ML; MG/100ML
125 INJECTION, SOLUTION INTRAVENOUS CONTINUOUS
OUTPATIENT
Start: 2024-12-04

## 2024-12-04 RX ORDER — BISACODYL 5 MG/1
10 TABLET, DELAYED RELEASE ORAL ONCE
Qty: 2 TABLET | Refills: 0 | Status: SHIPPED | OUTPATIENT
Start: 2024-12-04 | End: 2024-12-04
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
## 2024-12-04 NOTE — TELEPHONE ENCOUNTER
Procedure: Colonoscopy  Date: 12/24/2024  Physician performing: Dr. Hernández  Location of procedure:  [place]  Instructions given to patient: Golytely   Diabetic: N/A  Clearances: N/A      Patient has scheduled follow up and placed on cancellation list

## 2024-12-10 ENCOUNTER — ANESTHESIA EVENT (OUTPATIENT)
Dept: ANESTHESIOLOGY | Facility: HOSPITAL | Age: 39
End: 2024-12-10

## 2024-12-10 ENCOUNTER — ANESTHESIA (OUTPATIENT)
Dept: ANESTHESIOLOGY | Facility: HOSPITAL | Age: 39
End: 2024-12-10

## 2024-12-24 RX ORDER — SODIUM CHLORIDE 9 MG/ML
125 INJECTION, SOLUTION INTRAVENOUS CONTINUOUS
OUTPATIENT
Start: 2024-12-24

## 2025-01-30 ENCOUNTER — TELEPHONE (OUTPATIENT)
Dept: GASTROENTEROLOGY | Facility: MEDICAL CENTER | Age: 40
End: 2025-01-30

## 2025-01-30 NOTE — TELEPHONE ENCOUNTER
Left voicemail and requested call back     Called patient to see if patient would like to reschedule procedure ( Colonoscopy) at this time. Asked to please give us a call to schedule

## 2025-08-08 ENCOUNTER — HOSPITAL ENCOUNTER (EMERGENCY)
Facility: HOSPITAL | Age: 40
Discharge: HOME | End: 2025-08-08
Attending: EMERGENCY MEDICINE
Payer: MEDICARE

## 2025-08-11 ENCOUNTER — HOSPITAL ENCOUNTER (EMERGENCY)
Facility: HOSPITAL | Age: 40
Discharge: HOME | End: 2025-08-11
Payer: MEDICARE

## 2025-08-12 ENCOUNTER — HOSPITAL ENCOUNTER (EMERGENCY)
Facility: HOSPITAL | Age: 40
Discharge: HOME | End: 2025-08-12
Attending: EMERGENCY MEDICINE | Admitting: EMERGENCY MEDICINE
Payer: MEDICARE

## 2025-08-12 VITALS
TEMPERATURE: 98.4 F | OXYGEN SATURATION: 100 % | BODY MASS INDEX: 24.26 KG/M2 | HEART RATE: 103 BPM | DIASTOLIC BLOOD PRESSURE: 82 MMHG | RESPIRATION RATE: 18 BRPM | SYSTOLIC BLOOD PRESSURE: 128 MMHG | WEIGHT: 169.09 LBS

## 2025-08-12 DIAGNOSIS — Z51.89 VISIT FOR WOUND CHECK: Primary | ICD-10-CM

## 2025-08-12 DIAGNOSIS — T81.30XA WOUND DEHISCENCE: ICD-10-CM

## 2025-08-12 PROCEDURE — 99282 EMERGENCY DEPT VISIT SF MDM: CPT

## 2025-08-12 PROCEDURE — 99284 EMERGENCY DEPT VISIT MOD MDM: CPT
